# Patient Record
Sex: MALE | Race: WHITE | NOT HISPANIC OR LATINO | URBAN - METROPOLITAN AREA
[De-identification: names, ages, dates, MRNs, and addresses within clinical notes are randomized per-mention and may not be internally consistent; named-entity substitution may affect disease eponyms.]

---

## 2019-01-21 ENCOUNTER — OFFICE (OUTPATIENT)
Dept: URBAN - METROPOLITAN AREA CLINIC 19 | Facility: CLINIC | Age: 84
End: 2019-01-21
Payer: COMMERCIAL

## 2019-01-21 VITALS
WEIGHT: 233 LBS | HEART RATE: 65 BPM | HEIGHT: 70 IN | DIASTOLIC BLOOD PRESSURE: 64 MMHG | SYSTOLIC BLOOD PRESSURE: 150 MMHG

## 2019-01-21 DIAGNOSIS — K21.9 GASTRO-ESOPHAGEAL REFLUX DISEASE WITHOUT ESOPHAGITIS: ICD-10-CM

## 2019-01-21 PROCEDURE — 99204 OFFICE O/P NEW MOD 45 MIN: CPT | Performed by: INTERNAL MEDICINE

## 2019-02-21 ENCOUNTER — AMBULATORY SURGICAL CENTER (OUTPATIENT)
Dept: URBAN - METROPOLITAN AREA SURGERY 3 | Facility: SURGERY | Age: 84
End: 2019-02-21
Payer: COMMERCIAL

## 2019-02-21 VITALS
SYSTOLIC BLOOD PRESSURE: 179 MMHG | DIASTOLIC BLOOD PRESSURE: 92 MMHG | DIASTOLIC BLOOD PRESSURE: 92 MMHG | TEMPERATURE: 97.1 F | SYSTOLIC BLOOD PRESSURE: 152 MMHG | SYSTOLIC BLOOD PRESSURE: 163 MMHG | RESPIRATION RATE: 14 BRPM | SYSTOLIC BLOOD PRESSURE: 152 MMHG | OXYGEN SATURATION: 97 % | OXYGEN SATURATION: 96 % | RESPIRATION RATE: 20 BRPM | OXYGEN SATURATION: 95 % | OXYGEN SATURATION: 97 % | DIASTOLIC BLOOD PRESSURE: 80 MMHG | DIASTOLIC BLOOD PRESSURE: 78 MMHG | TEMPERATURE: 97.2 F | RESPIRATION RATE: 18 BRPM | RESPIRATION RATE: 24 BRPM | OXYGEN SATURATION: 94 % | OXYGEN SATURATION: 98 % | OXYGEN SATURATION: 98 % | SYSTOLIC BLOOD PRESSURE: 122 MMHG | WEIGHT: 230 LBS | RESPIRATION RATE: 20 BRPM | SYSTOLIC BLOOD PRESSURE: 165 MMHG | OXYGEN SATURATION: 96 % | OXYGEN SATURATION: 94 % | SYSTOLIC BLOOD PRESSURE: 163 MMHG | HEIGHT: 70 IN | OXYGEN SATURATION: 95 % | TEMPERATURE: 97.2 F | RESPIRATION RATE: 18 BRPM | HEART RATE: 65 BPM | WEIGHT: 230 LBS | SYSTOLIC BLOOD PRESSURE: 165 MMHG | SYSTOLIC BLOOD PRESSURE: 179 MMHG | RESPIRATION RATE: 24 BRPM | SYSTOLIC BLOOD PRESSURE: 122 MMHG | RESPIRATION RATE: 14 BRPM | DIASTOLIC BLOOD PRESSURE: 66 MMHG | DIASTOLIC BLOOD PRESSURE: 80 MMHG | TEMPERATURE: 97.1 F | DIASTOLIC BLOOD PRESSURE: 66 MMHG | DIASTOLIC BLOOD PRESSURE: 73 MMHG | HEIGHT: 70 IN | HEART RATE: 65 BPM | DIASTOLIC BLOOD PRESSURE: 73 MMHG | DIASTOLIC BLOOD PRESSURE: 78 MMHG

## 2019-02-21 DIAGNOSIS — K21.9 GASTRO-ESOPHAGEAL REFLUX DISEASE WITHOUT ESOPHAGITIS: ICD-10-CM

## 2019-02-21 DIAGNOSIS — K44.9 DIAPHRAGMATIC HERNIA WITHOUT OBSTRUCTION OR GANGRENE: ICD-10-CM

## 2019-02-21 PROBLEM — R11.10 REGURGITATION: Status: ACTIVE | Noted: 2019-02-21

## 2019-02-21 PROCEDURE — 43235 EGD DIAGNOSTIC BRUSH WASH: CPT | Performed by: INTERNAL MEDICINE

## 2019-02-21 PROCEDURE — G8918 PT W/O PREOP ORDER IV AB PRO: HCPCS | Performed by: INTERNAL MEDICINE

## 2019-02-21 PROCEDURE — G8907 PT DOC NO EVENTS ON DISCHARG: HCPCS | Performed by: INTERNAL MEDICINE

## 2021-03-19 ENCOUNTER — OFFICE (OUTPATIENT)
Dept: URBAN - METROPOLITAN AREA CLINIC 19 | Facility: CLINIC | Age: 86
End: 2021-03-19
Payer: COMMERCIAL

## 2021-03-19 VITALS
OXYGEN SATURATION: 99 % | SYSTOLIC BLOOD PRESSURE: 146 MMHG | DIASTOLIC BLOOD PRESSURE: 56 MMHG | WEIGHT: 243 LBS | HEART RATE: 73 BPM | HEIGHT: 70 IN

## 2021-03-19 DIAGNOSIS — K21.9 GASTRO-ESOPHAGEAL REFLUX DISEASE WITHOUT ESOPHAGITIS: ICD-10-CM

## 2021-03-19 DIAGNOSIS — K62.89 OTHER SPECIFIED DISEASES OF ANUS AND RECTUM: ICD-10-CM

## 2021-03-19 PROCEDURE — 99214 OFFICE O/P EST MOD 30 MIN: CPT | Performed by: INTERNAL MEDICINE

## 2021-03-19 RX ORDER — SODIUM PICOSULFATE, MAGNESIUM OXIDE, AND ANHYDROUS CITRIC ACID 10; 3.5; 12 MG/160ML; G/160ML; G/160ML
LIQUID ORAL
Qty: 1 | Refills: 0 | Status: COMPLETED
Start: 2021-03-19 | End: 2021-05-27

## 2021-05-27 ENCOUNTER — AMBULATORY SURGICAL CENTER (OUTPATIENT)
Dept: URBAN - METROPOLITAN AREA SURGERY 3 | Facility: SURGERY | Age: 86
End: 2021-05-27
Payer: COMMERCIAL

## 2021-05-27 ENCOUNTER — OFFICE (OUTPATIENT)
Dept: URBAN - METROPOLITAN AREA PATHOLOGY 22 | Facility: PATHOLOGY | Age: 86
End: 2021-05-27
Payer: COMMERCIAL

## 2021-05-27 VITALS
RESPIRATION RATE: 18 BRPM | TEMPERATURE: 97.4 F | RESPIRATION RATE: 18 BRPM | OXYGEN SATURATION: 94 % | OXYGEN SATURATION: 98 % | DIASTOLIC BLOOD PRESSURE: 63 MMHG | HEIGHT: 70 IN | HEART RATE: 65 BPM | DIASTOLIC BLOOD PRESSURE: 66 MMHG | RESPIRATION RATE: 17 BRPM | RESPIRATION RATE: 17 BRPM | HEART RATE: 65 BPM | RESPIRATION RATE: 14 BRPM | OXYGEN SATURATION: 95 % | OXYGEN SATURATION: 94 % | SYSTOLIC BLOOD PRESSURE: 90 MMHG | DIASTOLIC BLOOD PRESSURE: 77 MMHG | OXYGEN SATURATION: 98 % | RESPIRATION RATE: 18 BRPM | DIASTOLIC BLOOD PRESSURE: 66 MMHG | OXYGEN SATURATION: 95 % | SYSTOLIC BLOOD PRESSURE: 138 MMHG | DIASTOLIC BLOOD PRESSURE: 68 MMHG | DIASTOLIC BLOOD PRESSURE: 68 MMHG | RESPIRATION RATE: 14 BRPM | OXYGEN SATURATION: 95 % | SYSTOLIC BLOOD PRESSURE: 138 MMHG | SYSTOLIC BLOOD PRESSURE: 131 MMHG | HEART RATE: 66 BPM | DIASTOLIC BLOOD PRESSURE: 46 MMHG | DIASTOLIC BLOOD PRESSURE: 63 MMHG | SYSTOLIC BLOOD PRESSURE: 140 MMHG | OXYGEN SATURATION: 94 % | OXYGEN SATURATION: 98 % | SYSTOLIC BLOOD PRESSURE: 159 MMHG | RESPIRATION RATE: 14 BRPM | SYSTOLIC BLOOD PRESSURE: 133 MMHG | SYSTOLIC BLOOD PRESSURE: 140 MMHG | DIASTOLIC BLOOD PRESSURE: 63 MMHG | RESPIRATION RATE: 16 BRPM | DIASTOLIC BLOOD PRESSURE: 46 MMHG | DIASTOLIC BLOOD PRESSURE: 69 MMHG | SYSTOLIC BLOOD PRESSURE: 159 MMHG | RESPIRATION RATE: 16 BRPM | SYSTOLIC BLOOD PRESSURE: 131 MMHG | SYSTOLIC BLOOD PRESSURE: 131 MMHG | SYSTOLIC BLOOD PRESSURE: 140 MMHG | RESPIRATION RATE: 16 BRPM | TEMPERATURE: 97.4 F | HEART RATE: 65 BPM | SYSTOLIC BLOOD PRESSURE: 133 MMHG | WEIGHT: 230 LBS | DIASTOLIC BLOOD PRESSURE: 77 MMHG | DIASTOLIC BLOOD PRESSURE: 46 MMHG | SYSTOLIC BLOOD PRESSURE: 90 MMHG | SYSTOLIC BLOOD PRESSURE: 133 MMHG | WEIGHT: 230 LBS | SYSTOLIC BLOOD PRESSURE: 90 MMHG | WEIGHT: 230 LBS | DIASTOLIC BLOOD PRESSURE: 77 MMHG | DIASTOLIC BLOOD PRESSURE: 69 MMHG | DIASTOLIC BLOOD PRESSURE: 66 MMHG | TEMPERATURE: 97.4 F | HEART RATE: 66 BPM | SYSTOLIC BLOOD PRESSURE: 138 MMHG | HEIGHT: 70 IN | HEART RATE: 66 BPM | SYSTOLIC BLOOD PRESSURE: 159 MMHG | DIASTOLIC BLOOD PRESSURE: 68 MMHG | HEIGHT: 70 IN | RESPIRATION RATE: 17 BRPM | DIASTOLIC BLOOD PRESSURE: 69 MMHG

## 2021-05-27 DIAGNOSIS — D12.0 BENIGN NEOPLASM OF CECUM: ICD-10-CM

## 2021-05-27 DIAGNOSIS — Z86.010 PERSONAL HISTORY OF COLONIC POLYPS: ICD-10-CM

## 2021-05-27 DIAGNOSIS — K60.2 ANAL FISSURE, UNSPECIFIED: ICD-10-CM

## 2021-05-27 DIAGNOSIS — K57.30 DIVERTICULOSIS OF LARGE INTESTINE WITHOUT PERFORATION OR ABS: ICD-10-CM

## 2021-05-27 PROBLEM — K63.5 POLYP OF COLON: Status: ACTIVE | Noted: 2021-05-27

## 2021-05-27 PROCEDURE — 88305 TISSUE EXAM BY PATHOLOGIST: CPT | Performed by: INTERNAL MEDICINE

## 2021-05-27 PROCEDURE — 45385 COLONOSCOPY W/LESION REMOVAL: CPT | Mod: PT | Performed by: INTERNAL MEDICINE

## 2021-05-27 PROCEDURE — G8907 PT DOC NO EVENTS ON DISCHARG: HCPCS | Performed by: INTERNAL MEDICINE

## 2021-05-27 PROCEDURE — G8918 PT W/O PREOP ORDER IV AB PRO: HCPCS | Performed by: INTERNAL MEDICINE

## 2024-08-31 ENCOUNTER — APPOINTMENT (OUTPATIENT)
Dept: CT IMAGING | Facility: HOSPITAL | Age: 89
End: 2024-08-31
Payer: MEDICARE

## 2024-08-31 ENCOUNTER — HOSPITAL ENCOUNTER (INPATIENT)
Facility: HOSPITAL | Age: 89
LOS: 4 days | Discharge: HOME | End: 2024-09-04
Attending: EMERGENCY MEDICINE | Admitting: FAMILY MEDICINE
Payer: MEDICARE

## 2024-08-31 DIAGNOSIS — J18.9 PNEUMONIA OF BOTH LOWER LOBES DUE TO INFECTIOUS ORGANISM: ICD-10-CM

## 2024-08-31 DIAGNOSIS — R26.81 GAIT INSTABILITY: ICD-10-CM

## 2024-08-31 DIAGNOSIS — R55 SYNCOPE, UNSPECIFIED SYNCOPE TYPE: Primary | ICD-10-CM

## 2024-08-31 DIAGNOSIS — R13.13 PHARYNGEAL DYSPHAGIA: ICD-10-CM

## 2024-08-31 DIAGNOSIS — Z74.09 DECREASED FUNCTIONAL MOBILITY AND ENDURANCE: ICD-10-CM

## 2024-08-31 LAB
ALBUMIN SERPL-MCNC: 3.5 G/DL (ref 3.5–5.2)
ALBUMIN/GLOB SERPL: 1 G/DL
ALP SERPL-CCNC: 80 U/L (ref 39–117)
ALT SERPL W P-5'-P-CCNC: 23 U/L (ref 1–41)
ANION GAP SERPL CALCULATED.3IONS-SCNC: 9 MMOL/L (ref 5–15)
AST SERPL-CCNC: 41 U/L (ref 1–40)
B PARAPERT DNA SPEC QL NAA+PROBE: NOT DETECTED
B PERT DNA SPEC QL NAA+PROBE: NOT DETECTED
BASOPHILS # BLD AUTO: 0.02 10*3/MM3 (ref 0–0.2)
BASOPHILS NFR BLD AUTO: 0.3 % (ref 0–1.5)
BILIRUB SERPL-MCNC: 0.5 MG/DL (ref 0–1.2)
BILIRUB UR QL STRIP: NEGATIVE
BUN SERPL-MCNC: 30 MG/DL (ref 8–23)
BUN/CREAT SERPL: 17 (ref 7–25)
C PNEUM DNA NPH QL NAA+NON-PROBE: NOT DETECTED
CALCIUM SPEC-SCNC: 8.6 MG/DL (ref 8.6–10.5)
CHLORIDE SERPL-SCNC: 103 MMOL/L (ref 98–107)
CLARITY UR: CLEAR
CO2 SERPL-SCNC: 28 MMOL/L (ref 22–29)
COLOR UR: YELLOW
CREAT SERPL-MCNC: 1.76 MG/DL (ref 0.76–1.27)
D-LACTATE SERPL-SCNC: 1.3 MMOL/L (ref 0.5–2)
DEPRECATED RDW RBC AUTO: 54.3 FL (ref 37–54)
EGFRCR SERPLBLD CKD-EPI 2021: 36.7 ML/MIN/1.73
EOSINOPHIL # BLD AUTO: 0.02 10*3/MM3 (ref 0–0.4)
EOSINOPHIL NFR BLD AUTO: 0.3 % (ref 0.3–6.2)
ERYTHROCYTE [DISTWIDTH] IN BLOOD BY AUTOMATED COUNT: 15.9 % (ref 12.3–15.4)
FLUAV SUBTYP SPEC NAA+PROBE: NOT DETECTED
FLUBV RNA ISLT QL NAA+PROBE: NOT DETECTED
GEN 5 2HR TROPONIN T REFLEX: 36 NG/L
GLOBULIN UR ELPH-MCNC: 3.5 GM/DL
GLUCOSE SERPL-MCNC: 119 MG/DL (ref 65–99)
GLUCOSE UR STRIP-MCNC: NEGATIVE MG/DL
HADV DNA SPEC NAA+PROBE: NOT DETECTED
HCOV 229E RNA SPEC QL NAA+PROBE: NOT DETECTED
HCOV HKU1 RNA SPEC QL NAA+PROBE: NOT DETECTED
HCOV NL63 RNA SPEC QL NAA+PROBE: NOT DETECTED
HCOV OC43 RNA SPEC QL NAA+PROBE: NOT DETECTED
HCT VFR BLD AUTO: 32.1 % (ref 37.5–51)
HGB BLD-MCNC: 10.3 G/DL (ref 13–17.7)
HGB UR QL STRIP.AUTO: NEGATIVE
HMPV RNA NPH QL NAA+NON-PROBE: NOT DETECTED
HPIV1 RNA ISLT QL NAA+PROBE: NOT DETECTED
HPIV2 RNA SPEC QL NAA+PROBE: NOT DETECTED
HPIV3 RNA NPH QL NAA+PROBE: NOT DETECTED
HPIV4 P GENE NPH QL NAA+PROBE: NOT DETECTED
IMM GRANULOCYTES # BLD AUTO: 0.03 10*3/MM3 (ref 0–0.05)
IMM GRANULOCYTES NFR BLD AUTO: 0.4 % (ref 0–0.5)
KETONES UR QL STRIP: NEGATIVE
LEUKOCYTE ESTERASE UR QL STRIP.AUTO: NEGATIVE
LYMPHOCYTES # BLD AUTO: 0.43 10*3/MM3 (ref 0.7–3.1)
LYMPHOCYTES NFR BLD AUTO: 5.6 % (ref 19.6–45.3)
M PNEUMO IGG SER IA-ACNC: NOT DETECTED
MCH RBC QN AUTO: 29.7 PG (ref 26.6–33)
MCHC RBC AUTO-ENTMCNC: 32.1 G/DL (ref 31.5–35.7)
MCV RBC AUTO: 92.5 FL (ref 79–97)
MONOCYTES # BLD AUTO: 0.63 10*3/MM3 (ref 0.1–0.9)
MONOCYTES NFR BLD AUTO: 8.2 % (ref 5–12)
MRSA DNA SPEC QL NAA+PROBE: NORMAL
NEUTROPHILS NFR BLD AUTO: 6.54 10*3/MM3 (ref 1.7–7)
NEUTROPHILS NFR BLD AUTO: 85.2 % (ref 42.7–76)
NITRITE UR QL STRIP: NEGATIVE
NRBC BLD AUTO-RTO: 0 /100 WBC (ref 0–0.2)
PH UR STRIP.AUTO: 5.5 [PH] (ref 5–8)
PLATELET # BLD AUTO: 240 10*3/MM3 (ref 140–450)
PMV BLD AUTO: 11.2 FL (ref 6–12)
POTASSIUM SERPL-SCNC: 4.6 MMOL/L (ref 3.5–5.2)
PROT SERPL-MCNC: 7 G/DL (ref 6–8.5)
PROT UR QL STRIP: NEGATIVE
RBC # BLD AUTO: 3.47 10*6/MM3 (ref 4.14–5.8)
RHINOVIRUS RNA SPEC NAA+PROBE: NOT DETECTED
RSV RNA NPH QL NAA+NON-PROBE: NOT DETECTED
SARS-COV-2 RNA NPH QL NAA+NON-PROBE: NOT DETECTED
SODIUM SERPL-SCNC: 140 MMOL/L (ref 136–145)
SP GR UR STRIP: 1.01 (ref 1–1.03)
TROPONIN T DELTA: 1 NG/L
TROPONIN T SERPL HS-MCNC: 35 NG/L
UROBILINOGEN UR QL STRIP: NORMAL
WBC NRBC COR # BLD AUTO: 7.67 10*3/MM3 (ref 3.4–10.8)

## 2024-08-31 PROCEDURE — P9612 CATHETERIZE FOR URINE SPEC: HCPCS

## 2024-08-31 PROCEDURE — 93010 ELECTROCARDIOGRAM REPORT: CPT | Performed by: EMERGENCY MEDICINE

## 2024-08-31 PROCEDURE — 80053 COMPREHEN METABOLIC PANEL: CPT | Performed by: INTERNAL MEDICINE

## 2024-08-31 PROCEDURE — 0202U NFCT DS 22 TRGT SARS-COV-2: CPT | Performed by: FAMILY MEDICINE

## 2024-08-31 PROCEDURE — 94799 UNLISTED PULMONARY SVC/PX: CPT

## 2024-08-31 PROCEDURE — 93005 ELECTROCARDIOGRAM TRACING: CPT | Performed by: EMERGENCY MEDICINE

## 2024-08-31 PROCEDURE — 87040 BLOOD CULTURE FOR BACTERIA: CPT | Performed by: FAMILY MEDICINE

## 2024-08-31 PROCEDURE — 36415 COLL VENOUS BLD VENIPUNCTURE: CPT | Performed by: FAMILY MEDICINE

## 2024-08-31 PROCEDURE — 99285 EMERGENCY DEPT VISIT HI MDM: CPT

## 2024-08-31 PROCEDURE — 25010000002 PIPERACILLIN SOD-TAZOBACTAM PER 1 G: Performed by: INTERNAL MEDICINE

## 2024-08-31 PROCEDURE — 71250 CT THORAX DX C-: CPT

## 2024-08-31 PROCEDURE — 81003 URINALYSIS AUTO W/O SCOPE: CPT | Performed by: INTERNAL MEDICINE

## 2024-08-31 PROCEDURE — 87641 MR-STAPH DNA AMP PROBE: CPT | Performed by: FAMILY MEDICINE

## 2024-08-31 PROCEDURE — 36415 COLL VENOUS BLD VENIPUNCTURE: CPT

## 2024-08-31 PROCEDURE — 83605 ASSAY OF LACTIC ACID: CPT | Performed by: INTERNAL MEDICINE

## 2024-08-31 PROCEDURE — 70450 CT HEAD/BRAIN W/O DYE: CPT

## 2024-08-31 PROCEDURE — 74176 CT ABD & PELVIS W/O CONTRAST: CPT

## 2024-08-31 PROCEDURE — 85025 COMPLETE CBC W/AUTO DIFF WBC: CPT | Performed by: INTERNAL MEDICINE

## 2024-08-31 PROCEDURE — 94640 AIRWAY INHALATION TREATMENT: CPT

## 2024-08-31 PROCEDURE — 84484 ASSAY OF TROPONIN QUANT: CPT | Performed by: INTERNAL MEDICINE

## 2024-08-31 RX ORDER — MONTELUKAST SODIUM 10 MG/1
10 TABLET ORAL NIGHTLY
Status: DISCONTINUED | OUTPATIENT
Start: 2024-08-31 | End: 2024-09-03

## 2024-08-31 RX ORDER — RABEPRAZOLE SODIUM 20 MG/1
TABLET, DELAYED RELEASE ORAL
Status: ON HOLD | COMMUNITY
End: 2024-09-03

## 2024-08-31 RX ORDER — MEMANTINE HYDROCHLORIDE 5 MG/1
10 TABLET ORAL DAILY
Status: DISCONTINUED | OUTPATIENT
Start: 2024-08-31 | End: 2024-09-04 | Stop reason: HOSPADM

## 2024-08-31 RX ORDER — AMIODARONE HYDROCHLORIDE 200 MG/1
200 TABLET ORAL DAILY
COMMUNITY

## 2024-08-31 RX ORDER — DONEPEZIL HYDROCHLORIDE 5 MG/1
5 TABLET, FILM COATED ORAL NIGHTLY
COMMUNITY
Start: 2024-06-11

## 2024-08-31 RX ORDER — PREGABALIN 100 MG/1
100 CAPSULE ORAL TAKE AS DIRECTED
Status: ON HOLD | COMMUNITY
End: 2024-09-03

## 2024-08-31 RX ORDER — BISACODYL 5 MG/1
5 TABLET, DELAYED RELEASE ORAL DAILY PRN
Status: DISCONTINUED | OUTPATIENT
Start: 2024-08-31 | End: 2024-09-04 | Stop reason: HOSPADM

## 2024-08-31 RX ORDER — TAMSULOSIN HYDROCHLORIDE 0.4 MG/1
0.4 CAPSULE ORAL DAILY
Status: DISCONTINUED | OUTPATIENT
Start: 2024-08-31 | End: 2024-09-04 | Stop reason: HOSPADM

## 2024-08-31 RX ORDER — SODIUM CHLORIDE 0.9 % (FLUSH) 0.9 %
10 SYRINGE (ML) INJECTION AS NEEDED
Status: DISCONTINUED | OUTPATIENT
Start: 2024-08-31 | End: 2024-09-04 | Stop reason: HOSPADM

## 2024-08-31 RX ORDER — FUROSEMIDE 20 MG
20 TABLET ORAL 3 TIMES DAILY
COMMUNITY
Start: 2024-06-15

## 2024-08-31 RX ORDER — LEVOTHYROXINE SODIUM 125 UG/1
125 TABLET ORAL
COMMUNITY

## 2024-08-31 RX ORDER — ALLOPURINOL 100 MG/1
100 TABLET ORAL DAILY
Status: DISCONTINUED | OUTPATIENT
Start: 2024-08-31 | End: 2024-09-04 | Stop reason: HOSPADM

## 2024-08-31 RX ORDER — MONTELUKAST SODIUM 10 MG/1
TABLET ORAL
Status: ON HOLD | COMMUNITY
End: 2024-09-03

## 2024-08-31 RX ORDER — ALLOPURINOL 100 MG/1
100 TABLET ORAL 2 TIMES DAILY
COMMUNITY
Start: 2024-08-27

## 2024-08-31 RX ORDER — ATORVASTATIN CALCIUM 40 MG/1
40 TABLET, FILM COATED ORAL DAILY
COMMUNITY

## 2024-08-31 RX ORDER — ATORVASTATIN CALCIUM 10 MG/1
20 TABLET, FILM COATED ORAL DAILY
Status: DISCONTINUED | OUTPATIENT
Start: 2024-08-31 | End: 2024-09-04 | Stop reason: HOSPADM

## 2024-08-31 RX ORDER — TAMSULOSIN HYDROCHLORIDE 0.4 MG/1
2 CAPSULE ORAL NIGHTLY
COMMUNITY

## 2024-08-31 RX ORDER — ALBUTEROL SULFATE 5 MG/ML
1.25 SOLUTION RESPIRATORY (INHALATION)
Status: DISCONTINUED | OUTPATIENT
Start: 2024-08-31 | End: 2024-09-02

## 2024-08-31 RX ORDER — BUDESONIDE 0.5 MG/2ML
0.5 INHALANT ORAL
Status: DISCONTINUED | OUTPATIENT
Start: 2024-08-31 | End: 2024-09-04 | Stop reason: HOSPADM

## 2024-08-31 RX ORDER — AMOXICILLIN 250 MG
2 CAPSULE ORAL 2 TIMES DAILY
Status: DISCONTINUED | OUTPATIENT
Start: 2024-08-31 | End: 2024-09-04 | Stop reason: HOSPADM

## 2024-08-31 RX ORDER — GABAPENTIN 300 MG/1
CAPSULE ORAL
Status: ON HOLD | COMMUNITY
End: 2024-09-03

## 2024-08-31 RX ORDER — ONDANSETRON 2 MG/ML
4 INJECTION INTRAMUSCULAR; INTRAVENOUS EVERY 6 HOURS PRN
Status: DISCONTINUED | OUTPATIENT
Start: 2024-08-31 | End: 2024-09-04 | Stop reason: HOSPADM

## 2024-08-31 RX ORDER — AMIODARONE HYDROCHLORIDE 200 MG/1
200 TABLET ORAL
Status: DISCONTINUED | OUTPATIENT
Start: 2024-08-31 | End: 2024-09-04 | Stop reason: HOSPADM

## 2024-08-31 RX ORDER — MEMANTINE HYDROCHLORIDE 10 MG/1
10 TABLET ORAL 2 TIMES DAILY
COMMUNITY
Start: 2024-08-15

## 2024-08-31 RX ORDER — SODIUM CHLORIDE 0.9 % (FLUSH) 0.9 %
10 SYRINGE (ML) INJECTION EVERY 12 HOURS SCHEDULED
Status: DISCONTINUED | OUTPATIENT
Start: 2024-08-31 | End: 2024-09-04 | Stop reason: HOSPADM

## 2024-08-31 RX ORDER — SODIUM CHLORIDE 9 MG/ML
40 INJECTION, SOLUTION INTRAVENOUS AS NEEDED
Status: DISCONTINUED | OUTPATIENT
Start: 2024-08-31 | End: 2024-09-04 | Stop reason: HOSPADM

## 2024-08-31 RX ORDER — GABAPENTIN 300 MG/1
300 CAPSULE ORAL NIGHTLY
Status: DISCONTINUED | OUTPATIENT
Start: 2024-08-31 | End: 2024-09-03

## 2024-08-31 RX ORDER — FAMOTIDINE 20 MG/1
20 TABLET, FILM COATED ORAL
Status: DISCONTINUED | OUTPATIENT
Start: 2024-08-31 | End: 2024-09-02

## 2024-08-31 RX ORDER — LEVOTHYROXINE SODIUM 125 UG/1
125 TABLET ORAL DAILY
Status: DISCONTINUED | OUTPATIENT
Start: 2024-08-31 | End: 2024-09-04 | Stop reason: HOSPADM

## 2024-08-31 RX ORDER — BISACODYL 10 MG
10 SUPPOSITORY, RECTAL RECTAL DAILY PRN
Status: DISCONTINUED | OUTPATIENT
Start: 2024-08-31 | End: 2024-09-04 | Stop reason: HOSPADM

## 2024-08-31 RX ORDER — POLYETHYLENE GLYCOL 3350 17 G/17G
17 POWDER, FOR SOLUTION ORAL DAILY PRN
Status: DISCONTINUED | OUTPATIENT
Start: 2024-08-31 | End: 2024-09-04 | Stop reason: HOSPADM

## 2024-08-31 RX ADMIN — AMIODARONE HYDROCHLORIDE 200 MG: 200 TABLET ORAL at 19:01

## 2024-08-31 RX ADMIN — DOCUSATE SODIUM 50 MG AND SENNOSIDES 8.6 MG 2 TABLET: 8.6; 5 TABLET, FILM COATED ORAL at 21:07

## 2024-08-31 RX ADMIN — GABAPENTIN 300 MG: 300 CAPSULE ORAL at 21:07

## 2024-08-31 RX ADMIN — FAMOTIDINE 20 MG: 20 TABLET, FILM COATED ORAL at 19:03

## 2024-08-31 RX ADMIN — PIPERACILLIN SODIUM AND TAZOBACTAM SODIUM 3.38 G: 3; .375 INJECTION, POWDER, LYOPHILIZED, FOR SOLUTION INTRAVENOUS at 14:27

## 2024-08-31 RX ADMIN — MONTELUKAST SODIUM 10 MG: 10 TABLET, FILM COATED ORAL at 21:07

## 2024-08-31 RX ADMIN — Medication 10 ML: at 21:07

## 2024-08-31 RX ADMIN — MEMANTINE HYDROCHLORIDE 10 MG: 5 TABLET, FILM COATED ORAL at 19:01

## 2024-08-31 RX ADMIN — IPRATROPIUM BROMIDE 0.5 MG: 0.5 SOLUTION RESPIRATORY (INHALATION) at 18:29

## 2024-08-31 RX ADMIN — ALLOPURINOL 100 MG: 100 TABLET ORAL at 19:01

## 2024-08-31 RX ADMIN — ALBUTEROL SULFATE 1.25 MG: 2.5 SOLUTION RESPIRATORY (INHALATION) at 18:29

## 2024-08-31 RX ADMIN — ATORVASTATIN CALCIUM 20 MG: 10 TABLET, FILM COATED ORAL at 19:01

## 2024-08-31 RX ADMIN — TAMSULOSIN HYDROCHLORIDE 0.4 MG: 0.4 CAPSULE ORAL at 19:00

## 2024-08-31 RX ADMIN — BUDESONIDE 0.5 MG: 0.5 INHALANT RESPIRATORY (INHALATION) at 18:34

## 2024-08-31 RX ADMIN — APIXABAN 2.5 MG: 5 TABLET, FILM COATED ORAL at 21:07

## 2024-08-31 NOTE — PLAN OF CARE
Goal Outcome Evaluation:  Plan of Care Reviewed With: patient        Progress: no change  Outcome Evaluation: ADMITTED FROM ED. ALERT AND ORINETED. ROOM AIR. CONT. PULSE OX INTACT. SCD'S ON TELEMETRY ON. NO REQUEST FOR PRN PAIN MEDS. NO BM SINCE 08/28/2024. BOWEL SOUNDS NOTED. BED CHECK ON.

## 2024-08-31 NOTE — H&P
[facility]  HISTORY AND PHYSICAL    Date of Admission: 8/31/2024  Primary Care Physician: Toby Saavedra MD    Subjective   Primary Historian: Patient and family.    Chief Complaint: Vagovagal/constipation/pneumonia    History of Present Illness  Patient is 89-year-old male presented ER with episode of syncope while using the bathroom.  Initial blood pressure by EMS was 70 palpable.  CT scan shows possible pneumonia.  Patient received Zosyn in ER.  Discussed with ER believe this is more vagovagal causing syncope.  Patient was treated 3 weeks ago for pneumonia.  CT scans also shows constipation.    Patient has a past medical history of arthritis, atrial fibs, dementia, enlarged prostate, reflux, total knee replacement, colonoscopy, hard of hearing, hypertension, hyperlipidemia, hypothyroidism, neuropathy, pacemaker, renal disorder, sleep apnea.    Review of Systems   Constitutional:  Positive for activity change, appetite change and fatigue. Negative for chills and fever.   HENT:  Negative for hearing loss, nosebleeds, tinnitus and trouble swallowing.    Eyes:  Negative for visual disturbance.   Respiratory:  Negative for cough, chest tightness, shortness of breath and wheezing.    Cardiovascular:  Negative for chest pain, palpitations and leg swelling.   Gastrointestinal:  Negative for abdominal distention, abdominal pain, blood in stool, constipation, diarrhea, nausea and vomiting.   Endocrine: Negative for cold intolerance, heat intolerance, polydipsia, polyphagia and polyuria.   Genitourinary:  Negative for decreased urine volume, difficulty urinating, dysuria, flank pain, frequency and hematuria.   Musculoskeletal:  Positive for arthralgias, gait problem and myalgias. Negative for joint swelling.   Skin:  Negative for rash.   Allergic/Immunologic: Negative for immunocompromised state.   Neurological:  Positive for weakness. Negative for dizziness, syncope,  light-headedness and headaches.   Hematological:  Negative for adenopathy. Does not bruise/bleed easily.   Psychiatric/Behavioral:  Negative for confusion and sleep disturbance. The patient is not nervous/anxious.         Otherwise complete ROS reviewed and negative except as mentioned in the HPI.    Past Medical History:   Past Medical History:   Diagnosis Date    Arthritis     Atrial fib/flutter, transient     Dementia     Enlarged prostate     GERD (gastroesophageal reflux disease)     H/O total knee replacement     History of colonoscopy     [place] (hard of hearing)     Hypertension     Hypothyroidism     Neuropathy     Pacemaker     Renal disorder     Sleep apnea      Past Surgical History:History reviewed. No pertinent surgical history.  Social History:      Family History: family history includes Heart disease in his father and mother.       Allergies:  No Known Allergies    Medications:  Prior to Admission medications    Medication Sig Start Date End Date Taking? Authorizing Provider   allopurinol (ZYLOPRIM) 100 MG tablet  8/27/24  Yes Bandar Cates MD   donepezil (ARICEPT) 5 MG tablet  6/11/24  Yes ProviderBandar MD   furosemide (LASIX) 20 MG tablet  6/15/24  Yes ProviderBandar MD   memantine (NAMENDA) 10 MG tablet  8/15/24  Yes Provider, MD Bandar   amiodarone (PACERONE) 200 MG tablet     Provider, MD Bandar   apixaban (Eliquis) 2.5 MG tablet tablet     Provider, MD Bandar   atorvastatin (LIPITOR) 40 MG tablet     ProviderBandar MD   gabapentin (NEURONTIN) 300 MG capsule     Provider, MD Bandar   levothyroxine (Synthroid) 125 MCG tablet     Provider, MD Bandar   montelukast (SINGULAIR) 10 MG tablet     Provider, MD Bandar   pregabalin (LYRICA) 100 MG capsule Take 1 capsule by mouth 2 (Two) Times a Day.    Provider, MD Bandar   RABEprazole (ACIPHEX) 20 MG EC tablet     Provider, MD Bandar   tamsulosin (FLOMAX) 0.4 MG capsule 24 hr capsule      "Provider, Bandar, MD     I have utilized all available immediate resources to obtain, update, or review the patient's current medications (including all prescriptions, over-the-counter products, herbals, cannabis/cannabidiol products, and vitamin/mineral/dietary (nutritional) supplements).    Objective     Vital Signs: /62   Pulse 65   Temp 97.9 °F (36.6 °C) (Temporal)   Resp 20   Ht 175.3 cm (69\")   Wt 109 kg (240 lb)   SpO2 94%   BMI 35.44 kg/m²   Physical Exam  Vitals and nursing note reviewed.   Constitutional:       Comments: Advanced age.  Chronically ill.  Hard of hearing.   HENT:      Head: Normocephalic.   Eyes:      Conjunctiva/sclera: Conjunctivae normal.      Pupils: Pupils are equal, round, and reactive to light.   Cardiovascular:      Rate and Rhythm: Normal rate and regular rhythm.      Heart sounds: Normal heart sounds.   Pulmonary:      Effort: Pulmonary effort is normal. No respiratory distress.      Breath sounds: Normal breath sounds.      Comments: Patient is on room air.  Abdominal:      General: Bowel sounds are normal. There is no distension.      Palpations: Abdomen is soft.      Tenderness: There is no abdominal tenderness.      Comments: Obesity .   Musculoskeletal:         General: No swelling.      Cervical back: Neck supple.   Skin:     General: Skin is warm and dry.      Capillary Refill: Capillary refill takes 2 to 3 seconds.      Findings: No rash.   Neurological:      Mental Status: He is alert and oriented to person, place, and time.      Motor: Weakness present.      Coordination: Coordination abnormal.      Gait: Gait abnormal.   Psychiatric:         Mood and Affect: Mood normal.         Behavior: Behavior normal.              Results Reviewed:  Lab Results (last 24 hours)       Procedure Component Value Units Date/Time    High Sensitivity Troponin T 2Hr [[id number]]  (Abnormal) Collected: 08/31/24 1254    Specimen: Blood Updated: 08/31/24 1323     HS Troponin T " 36 ng/L      Troponin T Delta 1 ng/L     Narrative:      High Sensitive Troponin T Reference Range:  <14.0 ng/L- Negative Female for AMI  <22.0 ng/L- Negative Male for AMI  >=14 - Abnormal Female indicating possible myocardial injury.  >=22 - Abnormal Male indicating possible myocardial injury.   Clinicians would have to utilize clinical acumen, EKG, Troponin, and serial changes to determine if it is an Acute Myocardial Infarction or myocardial injury due to an underlying chronic condition.         Urinalysis With Culture If Indicated - Straight Cath [[id number]]  (Normal) Collected: 08/31/24 1141    Specimen: Urine from Straight Cath Updated: 08/31/24 1202     Color, UA Yellow     Appearance, UA Clear     pH, UA 5.5     Specific Gravity, UA 1.009     Glucose, UA Negative     Ketones, UA Negative     Bilirubin, UA Negative     Blood, UA Negative     Protein, UA Negative     Leuk Esterase, UA Negative     Nitrite, UA Negative     Urobilinogen, UA 0.2 E.U./dL    Narrative:      In absence of clinical symptoms, the presence of pyuria, bacteria, and/or nitrites on the urinalysis result does not correlate with infection.  Urine microscopic not indicated.    Comprehensive Metabolic Panel [[id number]]  (Abnormal) Collected: 08/31/24 1032    Specimen: Blood Updated: 08/31/24 1105     Glucose 119 mg/dL      BUN 30 mg/dL      Creatinine 1.76 mg/dL      Sodium 140 mmol/L      Potassium 4.6 mmol/L      Comment: Slight hemolysis detected by analyzer. Result may be falsely elevated.        Chloride 103 mmol/L      CO2 28.0 mmol/L      Calcium 8.6 mg/dL      Total Protein 7.0 g/dL      Albumin 3.5 g/dL      ALT (SGPT) 23 U/L      AST (SGOT) 41 U/L      Comment: Slight hemolysis detected by analyzer. Result may be falsely elevated.        Alkaline Phosphatase 80 U/L      Total Bilirubin 0.5 mg/dL      Globulin 3.5 gm/dL      A/G Ratio 1.0 g/dL      BUN/Creatinine Ratio 17.0     Anion Gap 9.0 mmol/L      eGFR 36.7 mL/min/1.73      Narrative:      GFR Normal >60  Chronic Kidney Disease <60  Kidney Failure <15    The GFR formula is only valid for adults with stable renal function between ages 18 and 70.    High Sensitivity Troponin T [[id number]]  (Abnormal) Collected: 08/31/24 1032    Specimen: Blood Updated: 08/31/24 1101     HS Troponin T 35 ng/L     Narrative:      High Sensitive Troponin T Reference Range:  <14.0 ng/L- Negative Female for AMI  <22.0 ng/L- Negative Male for AMI  >=14 - Abnormal Female indicating possible myocardial injury.  >=22 - Abnormal Male indicating possible myocardial injury.   Clinicians would have to utilize clinical acumen, EKG, Troponin, and serial changes to determine if it is an Acute Myocardial Infarction or myocardial injury due to an underlying chronic condition.         Lactic Acid, Plasma [[id number]]  (Normal) Collected: 08/31/24 1032    Specimen: Blood Updated: 08/31/24 1101     Lactate 1.3 mmol/L     CBC & Differential [[id number]]  (Abnormal) Collected: 08/31/24 1032    Specimen: Blood Updated: 08/31/24 1044    Narrative:      The following orders were created for panel order CBC & Differential.  Procedure                               Abnormality         Status                     ---------                               -----------         ------                     CBC Auto Differential[[id number]]        Abnormal            Final result                 Please view results for these tests on the individual orders.    CBC Auto Differential [[id number]]  (Abnormal) Collected: 08/31/24 1032    Specimen: Blood Updated: 08/31/24 1044     WBC 7.67 10*3/mm3      RBC 3.47 10*6/mm3      Hemoglobin 10.3 g/dL      Hematocrit 32.1 %      MCV 92.5 fL      MCH 29.7 pg      MCHC 32.1 g/dL      RDW 15.9 %      RDW-SD 54.3 fl      MPV 11.2 fL      Platelets 240 10*3/mm3      Neutrophil % 85.2 %      Lymphocyte % 5.6 %      Monocyte % 8.2 %      Eosinophil % 0.3 %      Basophil % 0.3 %      Immature Grans % 0.4 %       Neutrophils, Absolute 6.54 10*3/mm3      Lymphocytes, Absolute 0.43 10*3/mm3      Monocytes, Absolute 0.63 10*3/mm3      Eosinophils, Absolute 0.02 10*3/mm3      Basophils, Absolute 0.02 10*3/mm3      Immature Grans, Absolute 0.03 10*3/mm3      nRBC 0.0 /100 WBC           Imaging Results (Last 24 Hours)       Procedure Component Value Units Date/Time    CT Chest Without Contrast Diagnostic [[id number]] Collected: 08/31/24 1328     Updated: 08/31/24 1335    Narrative:      EXAM: CT CHEST WO CONTRAST DIAGNOSTIC-      DATE: 8/31/2024 11:45 AM     HISTORY: ? PNA seen on CTAP ? PNA       COMPARISON: CT abdomen and pelvis 8/31/2024.     DOSE LENGTH PRODUCT: 370.51 mGy.cm mGy cm. Automatic exposure control  was utilized to make radiation dose as low as reasonably achievable.     TECHNIQUE: Unenhanced CT images of the chest obtained with multiplanar  reformats.     FINDINGS:     MEDIASTINUM/EXTRATHORACIC: There is calcification of the thoracic aorta  which is torturous. There is coronary artery calcification and  calcification at the aortic valve. No pericardial or pleural effusion is  identified. Borderline mediastinal lymph nodes may be reactive. Lymph  node at the right hilum measures 1.2 cm in short axis.     LUNGS/AIRWAYS: There is scarring at the lung apices. There are patchy  bilateral areas of groundglass opacity right greater than left likely  related to pneumonitis. The airways are unremarkable. There is  atelectasis versus scarring at the lung bases.     INCLUDED UPPER ABDOMEN: There is cholelithiasis. Moderate stool is noted  in the colon.     SOFT TISSUES: There is mild gynecomastia.     BONES: No suspicious osseous lesion identified.       Impression:      1. Patchy bilateral areas of groundglass opacity right greater than left  likely infectious in etiology and borderline mediastinal and right hilar  lymph nodes are likely reactive.     This report was signed and finalized on 8/31/2024 1:32 PM by  Bro Schwartz.       CT Abdomen Pelvis Without Contrast [[id number]] Collected: 08/31/24 1107     Updated: 08/31/24 1114    Narrative:      EXAM: CT ABDOMEN PELVIS WO CONTRAST-      DATE: 8/31/2024 9:49 AM     HISTORY: Constipation       COMPARISON: None available.     DOSE LENGTH PRODUCT: 1557.56 mGy.cm Automatic exposure control was  utilized to make radiation dose as low as reasonably achievable.     TECHNIQUE: Noncontract axial images of the abdomen and pelvis obtained  with multiplanar reformats.     FINDINGS:  VISUALIZED CHEST: There is coronary artery calcification and  calcification at the aortic valve. No pericardial or pleural effusion is  identified. There is breathing motion artifact. There are patchy  groundglass opacities at both lung bases may represent pneumonia.     LIVER/BILE DUCTS: Unenhanced liver is unremarkable. There is  cholelithiasis without inflammatory change. Bile ducts are unremarkable.     KIDNEYS/URETERS: There is renal atrophy. No renal or ureteral calculi or  hydronephrosis is seen.     ADRENAL: Unremarkable.        SPLEEN: Unremarkable.     PANCREAS: Fatty infiltrated but otherwise unremarkable.     MESENTERY: No mesenteric or retroperitoneal lymphadenopathy or free  fluid is seen.     VASCULATURE: There is calcified atherosclerosis of the abdominal aorta  without aneurysm.     GI TRACT: There is moderate stool in the colon. No bowel obstruction or  inflammatory changes are seen.     PELVIS: There are small inguinal hernias containing fat. Urinary bladder  is unremarkable. No pelvic lymphadenopathy or free fluid is seen.     SOFT TISSUES: Small umbilical hernia contains fat.     BONES: No acute or aggressive bony lesion.           Impression:      1. Moderate constipation.  2. Cholelithiasis without definite inflammatory change.  3. Small inguinal hernias containing fat.  4. Patchy nodular groundglass opacities at the lung bases may represent  pneumonia.        This report was  signed and finalized on 8/31/2024 11:11 AM by Bro Schwartz.       CT Head Without Contrast [[id number]] Collected: 08/31/24 1104     Updated: 08/31/24 1108    Narrative:      EXAM: CT HEAD WO CONTRAST-      DATE: 8/31/2024 9:49 AM     HISTORY: Syncope and collapse       COMPARISON: None available.     DOSE LENGTH PRODUCT: 1557.56 mGy.cm  Automated exposure control was also  utilized to decrease patient radiation dose.     TECHNIQUE: Unenhanced CT images obtained from vertex to skull base with  multiplanar reformats.     FINDINGS:  There is no acute intracranial hemorrhage, midline shift, mass effect,  or hydrocephalus. There is no CT evidence for acute infarct.     There are chronic changes with volume loss and chronic small vessel  ischemic change of the periventricular white matter.      SOFT TISSUES: The scalp soft tissues are unremarkable.        SINUS: The visualized paranasal sinuses are clear. There is  opacification of the right mastoid air cells.     ORBITS: The visualized orbits and globes are unremarkable. There is  bilateral lens extraction.          Impression:      1. Chronic changes and no acute intracranial findings.   2. Opacification of the right mastoid air cells.     This report was signed and finalized on 8/31/2024 11:05 AM by Bro Schwartz.             I have personally reviewed and interpreted the radiology studies and ECG obtained at time of admission.     Assessment / Plan   Assessment:   Active Hospital Problems    Diagnosis     **Pneumonia     HCAP (healthcare-associated pneumonia)     Atrial fib/flutter, transient     Dementia     Arthritis     GERD (gastroesophageal reflux disease)     Enlarged prostate     H/O total knee replacement     [place] (hard of hearing)     Hypertension     Hypothyroidism     Neuropathy     Pacemaker     Renal disorder     Sleep apnea        Treatment Plan  The patient will be admitted to my service here at [facility].     Pneumonia/COPD.  Zosyn  was started in ER.  Singulair.  Continue Zosyn.  Half-strength DuoNebs.  Pulmicort.  CT scan of chest- Patchy bilateral areas of groundglass opacity right greater than left  likely infectious in etiology and borderline mediastinal and right hilar  lymph nodes are likely reactive.  Patient is on room air.    Anemia.  No sign of acute bleed.    History of atrial fibs/Pacemaker/syncope/CAD/hypertension/hyperlipidemia/pacemaker.  Episode of syncope during bowel movement episode, vasovagal.  Amiodarone . Eliquis.  Lipitor.  Hold Lasix.  Echocardiogram .    Dementia.  Hold Aricept due to GI side effects.  Continue Namenda.  CT scan of the head-Chronic changes and no acute intracranial findings, Opacification of the right mastoid air cells.     Gallbladder stone.  Denies any abdomen pain.  CT scan abdomen pelvic- Moderate constipation, Cholelithiasis without definite inflammatory change, Small inguinal hernias containing fat, Patchy nodular groundglass opacities at the lung bases may represent pneumonia.    History of complete occlusion of left carotid artery.  Plan request Doppler ultrasound of the neck.  Carotid duplex.     Constipation.  Constipation protocol.    Reflux/small inguinal hernia.  Pepcid.  Zofran as needed.    Chronic kidney disease stage III.  Will follow.  Creatinine is 1.7.    Neuropathy.  Neurontin nightly.    Hypothyroidism.  Synthroid.  TSH.    Prostate hypertrophy.  Flomax.    History of left knee replacement.  History of four back surgery.    Gout.  Allopurinol.    SCD.    Advanced age. 89 years old .    Nutrition.  Cardiac diet/renal diet.    Deconditioning.  PT and OT consult.    Family wants patient to [facility] to [facility] in [facility] for rehab.  Consult .    DNR.  DNI.    Blood cultures pending.    Medical Decision Making  Number and Complexity of problems: Pneumonia/dementia/failure to thrive/CAD/atrial fibrillation  Differential Diagnosis: None    Conditions and Status         Condition is unchanged.     [facility] Data  External documents reviewed: None.  Cardiac tracing (EKG, telemetry) interpretation: Pacing .  Radiology interpretation: CT scan .  Labs reviewed: Laboratory .  Any tests that were considered but not ordered: Lab in a.m.     Decision rules/scores evaluated (example [id number], [place], etc): None     Discussed with: Patient and family     Care Planning  Shared decision making: Patient and family  Code status and discussions: DNR . DNI    Disposition  Social Determinants of Health that impact treatment or disposition: From home  Estimated length of stay is 2 to 4 days.     I confirmed that the patient's advanced care plan is present, code status is documented, and a surrogate decision maker is listed in the patient's medical record.     The patient's surrogate decision maker is wife.     The patient was seen and examined by me on 8/31/2024 at 1430.    Electronically signed by Rashid Millard MD, 08/31/24, 15:26 CDT.

## 2024-08-31 NOTE — ED PROVIDER NOTES
Subjective   History of Present Illness  89-year-old male who presents emergency department after an episode of syncope while on the toilet.  The patient states he was sitting on the commode trying to have a bowel movement.  He states he woke up on the toilet.  He does not realize how long he was out for.  The patient was in route via helicopter to his usual hospital in [place], whether approached and the helicopter could not continue to fly forward.  It was noted that the patient's blood pressure systolic on scene was 70.  He is currently controlled.  He has a pacemaker.  He tells me over the last couple days he has declined with generalized weakness.  He states he could not hardly walk this morning when he got up.  He has chronic balance problems and neuropathy in his feet.  He has recently followed with home health and he is getting physical therapy.  He notes constipation for the last 3 days.  He states 3 days ago was his last good bowel movement.  He notes no blood in his stool.  He does have a history of trouble getting his urine out.  He has no chest pain or shortness of air on my exam    Review of Systems   Neurological:  Positive for syncope and weakness.       Past Medical History:   Diagnosis Date    Arthritis     Atrial fib/flutter, transient     Dementia     Enlarged prostate     GERD (gastroesophageal reflux disease)     H/O total knee replacement     History of colonoscopy     [place] (hard of hearing)     Hypertension     Hypothyroidism     Neuropathy     Pacemaker     Renal disorder     Sleep apnea      Health Status  Allergies:  Allergic Reactions (All)  NKA.  Medications: Per nurse's notes.  Immunizations: Per nurse's notes.    Past Medical/ Family/ Social History  Medical history:  Active  Afib ([id number])  Comments:  06/19/2013 CDT 11:18 CDT - TAMMY WRAY RN  patient has pacemaker  HTN (hypertension) ([id number])  Hyperlipidemia (75533795)  Enlarged prostate (993272371)  Hypothyroidism  (60233858)  [place] (hard of hearing) (41258332)  Acid reflux (192466844)  Arthritis (3839223)  Sleep apnea (119323702)  Comments:  06/19/2013 CDT 11:19 TAMMY MUSE RN  Patient is prescribed a CPAP at  but does not wear it, Reviewed as documented in chart.    Surgical history:  Colonoscopy Control Bleeding on 06/04/2021 at 86 Years.  Comments:  06/04/2021 10:13 KATHERIN GARCIA RN  auto-populated from documented surgical case  Colonoscopy with Submucosal Injection on 06/04/2021 at 86 Years.  Comments:  06/04/2021 10:13 KATHERIN GARCIA RN  auto-populated from documented surgical case  Colonoscopy GI Lab on 06/03/2021 at 86 Years.  Comments:  06/03/2021 16:00 CDT TAY LANCASTER RN  auto-populated from documented surgical case  Lumbar myelogram ([id number]) on 06/11/2020 at 85 Years.  Replacement of cardiac pacemaker battery ([id number]) on 08/24/2018 at 83 Years.  Total knee replacement ([id number]) in 2013 at 78 Years.  Procedure ([id number]) in 2013 at 78 Years.  Comments:  06/11/2020 09:34 CDLAURA - ABAD SULLIVAN RN  Multiple injections of steroids and anelgesia into back  lumbar back surgery x 4 in 1990 at 55 Years.  Permanent Pacemaker Placement., Reviewed as documented in chart.    Family history:  Heart attack  Mother  Breast cancer  Sister  Cancer of prostate  Brother  Stroke  Sister  , Reviewed as documented in chart.    Social history: Include Social History  Alcohol  Risk Assessment: Denies Alcohol Use  Substance Abuse  Risk Assessment: Denies Substance Abuse  Tobacco  Risk Assessment: Denies Tobacco Use  , Alcohol use: Denies, Tobacco use: Denies, Drug use: Denies,     Occupation: Retired, Family/social situation: .  Problem list: Per nurse's notes.    DC medications from 7/16/24:  Unchanged allopurinol (allopurinol 100 mg oral tablet) 2 tab(s) Oral Once daily  Unchanged amiodarone (amiodarone 200 mg oral tablet) 1 tab(s) Oral Once daily  Unchanged apixaban (Eliquis 2.5 mg  oral tablet) 1 tab(s) Oral 2 times a day  Unchanged atorvastatin (Lipitor 40 mg oral tablet) 1 tab(s) Oral Bedtime  Unchanged donepezil (donepezil 5 mg oral tablet) 1 tab(s) Oral Once daily  Unchanged furosemide (furosemide 20 mg oral tablet) 1 tab(s) Oral 3 times a day  Unchanged gabapentin (gabapentin 300 mg oral capsule) 1 cap Oral 3 times a day Leg pain, bilateral MUST SEE NP FOR ADDITIONAL REFILLS  Unchanged levothyroxine (levothyroxine 125 mcg (0.125 mg) oral tablet) 1 tab(s) Oral Once daily  Unchanged memantine (memantine 10 mg oral tablet) 1 tab(s) Oral 2 times a day  Unchanged montelukast (Singulair 10 mg oral tablet) 1 tab(s) Oral Every evening  Unchanged pregabalin (pregabalin 100 mg oral capsule) ,cap(s) Oral  Unchanged RABEprazole (RABEprazole 20 mg oral delayed release tablet) 1 tab(s) Once daily  Unchanged tamsulosin (tamsulosin 0.4 mg oral capsule)     No Known Allergies      Social History     Socioeconomic History    Marital status:      Labs reviewed from prior facility:  CBC   [facility]08/14/2024  Component 08/14/2024 08/13/2024 07/18/2024 07/17/2024 07/16/2024           White Blood Cell Count 7.4 9.6 4.7 Low     4.1 Low     7.0   HEMOGLOBIN 9.5 Low     11.0 Low     9.2 Low     9.6 Low     11.1 Low       HEMATOCRIT 29.3 Low     34.8 Low     29.4 Low     31.0 Low     36.1 Low       Platelet Count Plasma 174 201 134 Low     137 Low     149   MPV 11.6 12.0 High     12.3 High     12.3 High     12.0 High       MEAN CELL VOLUME 94 95 99 99 98   RDW 54 High     54 High     60 High     60 High     58 High       MEAN CELL HEMOGLOBIN 30.4 29.9 31.1 High     30.8 30.2   MEAN CELL HEMOGLOBIN CONCENTRATION 32.4 Low     31.6 Low     31.3 Low     31.0 Low             CHEM PROFILE   [facility]08/14/2024  Component 08/14/2024 08/13/2024 07/18/2024 07/17/2024 07/16/2024 07/16/2024 07/16/2024 07/16/2024              Sodium 141 139 141 139 143 -- -- --   Potassium 4.4 4.7 3.4  Low     3.6 4.1 -- -- --   Chloride 102 102 108 High     108 High     109 High     -- -- --   CO2 28 28 25 24 24 -- -- --   Anion Gap 11.0 9.0 8.0 7.0 10.0 -- -- --   Glucose 93 126 High     102 110 122 High     -- -- --   BUN 29 High     31 High     29 High     29 High     31 High     -- -- --   Creatinine 1.47 High     2.00 High     1.61 High     1.55 High     1.74 High     -- -- --   BUN/CREATININE RATIO 19.7 15.5 18.0 18.7 17.8 -- -- --   Calcium 8.2 Low     8.5 8.3 Low     7.7 Low     8.6          Objective   Physical Exam  Vitals reviewed.   Constitutional:       Appearance: Normal appearance. He is not ill-appearing.   HENT:      Head: Normocephalic and atraumatic.      Right Ear: External ear normal.      Left Ear: External ear normal.      Nose: Nose normal.   Eyes:      General: No scleral icterus.     Conjunctiva/sclera: Conjunctivae normal.   Cardiovascular:      Rate and Rhythm: Normal rate and regular rhythm.      Heart sounds: Normal heart sounds.   Pulmonary:      Effort: Pulmonary effort is normal.      Breath sounds: Normal breath sounds.   Abdominal:      General: There is no distension.      Palpations: Abdomen is soft.   Musculoskeletal:         General: No tenderness.      Cervical back: Normal range of motion and neck supple.      Comments: Mild LE edema bilaterally   Skin:     General: Skin is warm and dry.   Neurological:      General: No focal deficit present.      Mental Status: He is alert.      Cranial Nerves: No cranial nerve deficit.   Psychiatric:         Mood and Affect: Mood normal.         Behavior: Behavior normal.         Procedures       Lab Results (last 24 hours)       Procedure Component Value Units Date/Time    CBC & Differential [[id number]]  (Abnormal) Collected: 08/31/24 1032    Specimen: Blood Updated: 08/31/24 1044    Narrative:      The following orders were created for panel order CBC & Differential.  Procedure                               Abnormality         Status                      ---------                               -----------         ------                     CBC Auto Differential[[id number]]        Abnormal            Final result                 Please view results for these tests on the individual orders.    Comprehensive Metabolic Panel [[id number]]  (Abnormal) Collected: 08/31/24 1032    Specimen: Blood Updated: 08/31/24 1105     Glucose 119 mg/dL      BUN 30 mg/dL      Creatinine 1.76 mg/dL      Sodium 140 mmol/L      Potassium 4.6 mmol/L      Comment: Slight hemolysis detected by analyzer. Result may be falsely elevated.        Chloride 103 mmol/L      CO2 28.0 mmol/L      Calcium 8.6 mg/dL      Total Protein 7.0 g/dL      Albumin 3.5 g/dL      ALT (SGPT) 23 U/L      AST (SGOT) 41 U/L      Comment: Slight hemolysis detected by analyzer. Result may be falsely elevated.        Alkaline Phosphatase 80 U/L      Total Bilirubin 0.5 mg/dL      Globulin 3.5 gm/dL      A/G Ratio 1.0 g/dL      BUN/Creatinine Ratio 17.0     Anion Gap 9.0 mmol/L      eGFR 36.7 mL/min/1.73     Narrative:      GFR Normal >60  Chronic Kidney Disease <60  Kidney Failure <15    The GFR formula is only valid for adults with stable renal function between ages 18 and 70.    High Sensitivity Troponin T [[id number]]  (Abnormal) Collected: 08/31/24 1032    Specimen: Blood Updated: 08/31/24 1101     HS Troponin T 35 ng/L     Narrative:      High Sensitive Troponin T Reference Range:  <14.0 ng/L- Negative Female for AMI  <22.0 ng/L- Negative Male for AMI  >=14 - Abnormal Female indicating possible myocardial injury.  >=22 - Abnormal Male indicating possible myocardial injury.   Clinicians would have to utilize clinical acumen, EKG, Troponin, and serial changes to determine if it is an Acute Myocardial Infarction or myocardial injury due to an underlying chronic condition.         Lactic Acid, Plasma [[id number]]  (Normal) Collected: 08/31/24 1032    Specimen: Blood Updated: 08/31/24 1101     Lactate 1.3  mmol/L     CBC Auto Differential [[id number]]  (Abnormal) Collected: 08/31/24 1032    Specimen: Blood Updated: 08/31/24 1044     WBC 7.67 10*3/mm3      RBC 3.47 10*6/mm3      Hemoglobin 10.3 g/dL      Hematocrit 32.1 %      MCV 92.5 fL      MCH 29.7 pg      MCHC 32.1 g/dL      RDW 15.9 %      RDW-SD 54.3 fl      MPV 11.2 fL      Platelets 240 10*3/mm3      Neutrophil % 85.2 %      Lymphocyte % 5.6 %      Monocyte % 8.2 %      Eosinophil % 0.3 %      Basophil % 0.3 %      Immature Grans % 0.4 %      Neutrophils, Absolute 6.54 10*3/mm3      Lymphocytes, Absolute 0.43 10*3/mm3      Monocytes, Absolute 0.63 10*3/mm3      Eosinophils, Absolute 0.02 10*3/mm3      Basophils, Absolute 0.02 10*3/mm3      Immature Grans, Absolute 0.03 10*3/mm3      nRBC 0.0 /100 WBC     Urinalysis With Culture If Indicated - Straight Cath [[id number]]  (Normal) Collected: 08/31/24 1141    Specimen: Urine from Straight Cath Updated: 08/31/24 1202     Color, UA Yellow     Appearance, UA Clear     pH, UA 5.5     Specific Gravity, UA 1.009     Glucose, UA Negative     Ketones, UA Negative     Bilirubin, UA Negative     Blood, UA Negative     Protein, UA Negative     Leuk Esterase, UA Negative     Nitrite, UA Negative     Urobilinogen, UA 0.2 E.U./dL    Narrative:      In absence of clinical symptoms, the presence of pyuria, bacteria, and/or nitrites on the urinalysis result does not correlate with infection.  Urine microscopic not indicated.    High Sensitivity Troponin T 2Hr [[id number]]  (Abnormal) Collected: 08/31/24 1254    Specimen: Blood Updated: 08/31/24 1323     HS Troponin T 36 ng/L      Troponin T Delta 1 ng/L     Narrative:      High Sensitive Troponin T Reference Range:  <14.0 ng/L- Negative Female for AMI  <22.0 ng/L- Negative Male for AMI  >=14 - Abnormal Female indicating possible myocardial injury.  >=22 - Abnormal Male indicating possible myocardial injury.   Clinicians would have to utilize clinical acumen, EKG, Troponin, and  serial changes to determine if it is an Acute Myocardial Infarction or myocardial injury due to an underlying chronic condition.               CT Chest Without Contrast Diagnostic   Final Result   1. Patchy bilateral areas of groundglass opacity right greater than left   likely infectious in etiology and borderline mediastinal and right hilar   lymph nodes are likely reactive.       This report was signed and finalized on 8/31/2024 1:32 PM by Bro Schwartz.          CT Head Without Contrast   Final Result   1. Chronic changes and no acute intracranial findings.    2. Opacification of the right mastoid air cells.       This report was signed and finalized on 8/31/2024 11:05 AM by Bro Schwartz.          CT Abdomen Pelvis Without Contrast   Final Result   1. Moderate constipation.   2. Cholelithiasis without definite inflammatory change.   3. Small inguinal hernias containing fat.   4. Patchy nodular groundglass opacities at the lung bases may represent   pneumonia.           This report was signed and finalized on 8/31/2024 11:11 AM by Bro Schwartz.            Lesli ordered for HCAP    ED Course  ED Course as of 08/31/24 1352   Sat Aug 31, 2024   1206 Wife now at bedside. She stats that the patient was admitted to the hospital recently for PNA. She states that he did recently stop ABX for this. She is uncertain of the ABX that he was taking. Patient notes intermittent coughing that has worsened.  [AJ]   1207 CT scan of the chest ordered to evaluate.  [AJ]   1207 Labs from prior reviewed and appears that CR and HBG appear chronic.  [AJ]   1336 CURB 65 +2 points moderate risk [AJ]   1346 Spoke with family. Discussed possible admission. They are OK if he is admitted to our facility. Will contact hospitalist.  [AJ]   1350 Hospitalist agreeable to admission. Dr. Millard [AJ]      ED Course User Index  [AJ] Apple Berry, DO                                             Medical Decision Making  DDX:  Vasovagal syncope, UTI, or light abnormality    Problems Addressed:  Pneumonia of both lower lobes due to infectious organism: complicated acute illness or injury  Syncope, unspecified syncope type: complicated acute illness or injury    Amount and/or Complexity of Data Reviewed  Labs: ordered.     Details: CBC CMP troponin UA  Radiology: ordered.     Details: CT head and abdomen  ECG/medicine tests: ordered.    Risk  Decision regarding hospitalization.        Final diagnoses:   Syncope, unspecified syncope type   Pneumonia of both lower lobes due to infectious organism       ED Disposition  ED Disposition       ED Disposition   Decision to Admit    Condition   --    Comment   Level of Care: Telemetry [5]   Diagnosis: Pneumonia [616724]   Admitting Physician: LINDSEY LAWSON [[id number]]                 No follow-up provider specified.       Medication List      No changes were made to your prescriptions during this visit.            Apple Berry, DO  08/31/24 1102       Apple Berry, DO  08/31/24 1130       Apple Berry, DO  08/31/24 1141       Apple Berry, DO  08/31/24 1150       Apple Berry, DO  08/31/24 1353

## 2024-08-31 NOTE — ED NOTES
Nursing report ED to floor  Rubin Martell  89 y.o.  male    HPI:   Chief Complaint   Patient presents with    Syncope       Admitting doctor:   Rashid Millard MD    Consulting provider(s):  Consults       No orders found from 8/2/2024 to 9/1/2024.             Admitting diagnosis:   The primary encounter diagnosis was Syncope, unspecified syncope type. A diagnosis of Pneumonia of both lower lobes due to infectious organism was also pertinent to this visit.    Code status:   Current Code Status       Date Active Code Status Order ID Comments User Context       Not on file            Allergies:   Patient has no known allergies.    Intake and Output    Intake/Output Summary (Last 24 hours) at 8/31/2024 1548  Last data filed at 8/31/2024 1506  Gross per 24 hour   Intake 100 ml   Output 1400 ml   Net -1300 ml       Weight:       08/31/24  1001   Weight: 109 kg (240 lb)       Most recent vitals:   Vitals:    08/31/24 1446 08/31/24 1501 08/31/24 1516 08/31/24 1531   BP: 138/59 130/61 139/64 130/62   BP Location:       Patient Position:       Pulse: 65 65 65 65   Resp:       Temp:       TempSrc:       SpO2: 95% 95% 95% 92%   Weight:       Height:         Oxygen Therapy: .    Active LDAs/IV Access:   Lines, Drains & Airways       Active LDAs       Name Placement date Placement time Site Days    Peripheral IV 08/31/24 0935 Posterior;Right Hand 08/31/24  0935  Hand  less than 1    Peripheral IV 08/31/24 1036 Anterior;Left Forearm 08/31/24  1036  Forearm  less than 1                    Labs (abnormal labs have a star):   Labs Reviewed   COMPREHENSIVE METABOLIC PANEL - Abnormal; Notable for the following components:       Result Value    Glucose 119 (*)     BUN 30 (*)     Creatinine 1.76 (*)     AST (SGOT) 41 (*)     eGFR 36.7 (*)     All other components within normal limits    Narrative:     GFR Normal >60  Chronic Kidney Disease <60  Kidney Failure <15    The GFR formula is only valid for adults with stable renal function between  ages 18 and 70.   TROPONIN - Abnormal; Notable for the following components:    HS Troponin T 35 (*)     All other components within normal limits    Narrative:     High Sensitive Troponin T Reference Range:  <14.0 ng/L- Negative Female for AMI  <22.0 ng/L- Negative Male for AMI  >=14 - Abnormal Female indicating possible myocardial injury.  >=22 - Abnormal Male indicating possible myocardial injury.   Clinicians would have to utilize clinical acumen, EKG, Troponin, and serial changes to determine if it is an Acute Myocardial Infarction or myocardial injury due to an underlying chronic condition.        CBC WITH AUTO DIFFERENTIAL - Abnormal; Notable for the following components:    RBC 3.47 (*)     Hemoglobin 10.3 (*)     Hematocrit 32.1 (*)     RDW 15.9 (*)     RDW-SD 54.3 (*)     Neutrophil % 85.2 (*)     Lymphocyte % 5.6 (*)     Lymphocytes, Absolute 0.43 (*)     All other components within normal limits   HIGH SENSITIVITIY TROPONIN T 2HR - Abnormal; Notable for the following components:    HS Troponin T 36 (*)     All other components within normal limits    Narrative:     High Sensitive Troponin T Reference Range:  <14.0 ng/L- Negative Female for AMI  <22.0 ng/L- Negative Male for AMI  >=14 - Abnormal Female indicating possible myocardial injury.  >=22 - Abnormal Male indicating possible myocardial injury.   Clinicians would have to utilize clinical acumen, EKG, Troponin, and serial changes to determine if it is an Acute Myocardial Infarction or myocardial injury due to an underlying chronic condition.        LACTIC ACID, PLASMA - Normal   URINALYSIS W/ CULTURE IF INDICATED - Normal    Narrative:     In absence of clinical symptoms, the presence of pyuria, bacteria, and/or nitrites on the urinalysis result does not correlate with infection.  Urine microscopic not indicated.   BLOOD CULTURE WITH DESIRE   BLOOD CULTURE WITH DESIRE   CBC AND DIFFERENTIAL    Narrative:     The following orders were created for panel  order CBC & Differential.  Procedure                               Abnormality         Status                     ---------                               -----------         ------                     CBC Auto Differential[[id number]]        Abnormal            Final result                 Please view results for these tests on the individual orders.       Meds given in ED:   Medications   piperacillin-tazobactam (ZOSYN) 3.375 g IVPB in 100 mL NS MBP (CD) (0 g Intravenous Stopped 8/31/24 1506)     No current facility-administered medications for this encounter.       NIH Stroke Scale:       Isolation/Infection(s):  No active isolations   No active infections     COVID Testing  Collected .  Resulted .    Nursing report ED to floor:  Mental status: .  Ambulatory status: .  Precautions: .    ED nurse phone extentsion- ..

## 2024-08-31 NOTE — Clinical Note
Level of Care: Telemetry [5]   Diagnosis: Pneumonia [[id number]]   Admitting Physician: LINDSEY LAWSON [[id number]]

## 2024-09-01 ENCOUNTER — APPOINTMENT (OUTPATIENT)
Dept: CARDIOLOGY | Facility: HOSPITAL | Age: 89
End: 2024-09-01
Payer: MEDICARE

## 2024-09-01 ENCOUNTER — APPOINTMENT (OUTPATIENT)
Dept: ULTRASOUND IMAGING | Facility: HOSPITAL | Age: 89
End: 2024-09-01
Payer: MEDICARE

## 2024-09-01 PROBLEM — R55 VASOVAGAL SYNCOPE: Status: ACTIVE | Noted: 2024-09-01

## 2024-09-01 LAB
ALBUMIN SERPL-MCNC: 3.1 G/DL (ref 3.5–5.2)
ALBUMIN/GLOB SERPL: 1 G/DL
ALP SERPL-CCNC: 74 U/L (ref 39–117)
ALT SERPL W P-5'-P-CCNC: 20 U/L (ref 1–41)
ANION GAP SERPL CALCULATED.3IONS-SCNC: 7 MMOL/L (ref 5–15)
AST SERPL-CCNC: 31 U/L (ref 1–40)
BASOPHILS # BLD AUTO: 0.03 10*3/MM3 (ref 0–0.2)
BASOPHILS NFR BLD AUTO: 0.7 % (ref 0–1.5)
BH CV ECHO MEAS - AO MAX PG: 32 MMHG
BH CV ECHO MEAS - AO MEAN PG: 16.3 MMHG
BH CV ECHO MEAS - AO ROOT DIAM: 3.2 CM
BH CV ECHO MEAS - AO V2 MAX: 283 CM/SEC
BH CV ECHO MEAS - AO V2 VTI: 58.5 CM
BH CV ECHO MEAS - AVA(I,D): 1.37 CM2
BH CV ECHO MEAS - EDV(CUBED): 105.2 ML
BH CV ECHO MEAS - EDV(MOD-SP2): 78.1 ML
BH CV ECHO MEAS - EDV(MOD-SP4): 78.2 ML
BH CV ECHO MEAS - EF(MOD-BP): 62.4 %
BH CV ECHO MEAS - EF(MOD-SP2): 55.1 %
BH CV ECHO MEAS - EF(MOD-SP4): 69.7 %
BH CV ECHO MEAS - ESV(CUBED): 16.6 ML
BH CV ECHO MEAS - ESV(MOD-SP2): 35.1 ML
BH CV ECHO MEAS - ESV(MOD-SP4): 23.7 ML
BH CV ECHO MEAS - FS: 46 %
BH CV ECHO MEAS - IVS/LVPW: 1.09 CM
BH CV ECHO MEAS - IVSD: 1.49 CM
BH CV ECHO MEAS - LA DIMENSION: 4.6 CM
BH CV ECHO MEAS - LAT PEAK E' VEL: 8.1 CM/SEC
BH CV ECHO MEAS - LV DIASTOLIC VOL/BSA (35-75): 35 CM2
BH CV ECHO MEAS - LV MASS(C)D: 275.5 GRAMS
BH CV ECHO MEAS - LV MAX PG: 5.5 MMHG
BH CV ECHO MEAS - LV MEAN PG: 2 MMHG
BH CV ECHO MEAS - LV SYSTOLIC VOL/BSA (12-30): 10.6 CM2
BH CV ECHO MEAS - LV V1 MAX: 116.3 CM/SEC
BH CV ECHO MEAS - LV V1 VTI: 21.1 CM
BH CV ECHO MEAS - LVIDD: 4.7 CM
BH CV ECHO MEAS - LVIDS: 2.6 CM
BH CV ECHO MEAS - LVOT AREA: 3.8 CM2
BH CV ECHO MEAS - LVOT DIAM: 2.2 CM
BH CV ECHO MEAS - LVPWD: 1.37 CM
BH CV ECHO MEAS - MED PEAK E' VEL: 6.5 CM/SEC
BH CV ECHO MEAS - MV A MAX VEL: 56.6 CM/SEC
BH CV ECHO MEAS - MV DEC TIME: 0.2 SEC
BH CV ECHO MEAS - MV E MAX VEL: 76.7 CM/SEC
BH CV ECHO MEAS - MV E/A: 1.36
BH CV ECHO MEAS - PI END-D VEL: 107 CM/SEC
BH CV ECHO MEAS - RAP SYSTOLE: 3 MMHG
BH CV ECHO MEAS - RVSP: 34.8 MMHG
BH CV ECHO MEAS - SV(LVOT): 80.2 ML
BH CV ECHO MEAS - SV(MOD-SP2): 43 ML
BH CV ECHO MEAS - SV(MOD-SP4): 54.5 ML
BH CV ECHO MEAS - SVI(LVOT): 35.9 ML/M2
BH CV ECHO MEAS - SVI(MOD-SP2): 19.3 ML/M2
BH CV ECHO MEAS - SVI(MOD-SP4): 24.4 ML/M2
BH CV ECHO MEAS - TR MAX PG: 31.8 MMHG
BH CV ECHO MEAS - TR MAX VEL: 282 CM/SEC
BH CV ECHO MEASUREMENTS AVERAGE E/E' RATIO: 10.51
BH CV XLRA - RV BASE: 4 CM
BH CV XLRA - RV LENGTH: 6.5 CM
BH CV XLRA - RV MID: 2.9 CM
BILIRUB SERPL-MCNC: 0.3 MG/DL (ref 0–1.2)
BUN SERPL-MCNC: 26 MG/DL (ref 8–23)
BUN/CREAT SERPL: 16.6 (ref 7–25)
CALCIUM SPEC-SCNC: 8.4 MG/DL (ref 8.6–10.5)
CHLORIDE SERPL-SCNC: 104 MMOL/L (ref 98–107)
CHOLEST SERPL-MCNC: 103 MG/DL (ref 0–200)
CO2 SERPL-SCNC: 30 MMOL/L (ref 22–29)
CREAT SERPL-MCNC: 1.57 MG/DL (ref 0.76–1.27)
DEPRECATED RDW RBC AUTO: 55.2 FL (ref 37–54)
EGFRCR SERPLBLD CKD-EPI 2021: 42.1 ML/MIN/1.73
EOSINOPHIL # BLD AUTO: 0.08 10*3/MM3 (ref 0–0.4)
EOSINOPHIL NFR BLD AUTO: 1.8 % (ref 0.3–6.2)
ERYTHROCYTE [DISTWIDTH] IN BLOOD BY AUTOMATED COUNT: 15.9 % (ref 12.3–15.4)
GLOBULIN UR ELPH-MCNC: 3.1 GM/DL
GLUCOSE SERPL-MCNC: 101 MG/DL (ref 65–99)
HBA1C MFR BLD: 5.6 % (ref 4.8–5.6)
HCT VFR BLD AUTO: 30.3 % (ref 37.5–51)
HDLC SERPL-MCNC: 36 MG/DL (ref 40–60)
HGB BLD-MCNC: 9.5 G/DL (ref 13–17.7)
IMM GRANULOCYTES # BLD AUTO: 0.01 10*3/MM3 (ref 0–0.05)
IMM GRANULOCYTES NFR BLD AUTO: 0.2 % (ref 0–0.5)
L PNEUMO1 AG UR QL IA: NEGATIVE
LDLC SERPL CALC-MCNC: 54 MG/DL (ref 0–100)
LDLC/HDLC SERPL: 1.54 {RATIO}
LEFT ATRIUM VOLUME INDEX: 36.5 ML/M2
LEFT ATRIUM VOLUME: 81.3 ML
LYMPHOCYTES # BLD AUTO: 0.86 10*3/MM3 (ref 0.7–3.1)
LYMPHOCYTES NFR BLD AUTO: 19.1 % (ref 19.6–45.3)
MCH RBC QN AUTO: 29.5 PG (ref 26.6–33)
MCHC RBC AUTO-ENTMCNC: 31.4 G/DL (ref 31.5–35.7)
MCV RBC AUTO: 94.1 FL (ref 79–97)
MONOCYTES # BLD AUTO: 0.42 10*3/MM3 (ref 0.1–0.9)
MONOCYTES NFR BLD AUTO: 9.3 % (ref 5–12)
NEUTROPHILS NFR BLD AUTO: 3.11 10*3/MM3 (ref 1.7–7)
NEUTROPHILS NFR BLD AUTO: 68.9 % (ref 42.7–76)
NRBC BLD AUTO-RTO: 0 /100 WBC (ref 0–0.2)
PLATELET # BLD AUTO: 219 10*3/MM3 (ref 140–450)
PMV BLD AUTO: 11.1 FL (ref 6–12)
POTASSIUM SERPL-SCNC: 4.5 MMOL/L (ref 3.5–5.2)
PROCALCITONIN SERPL-MCNC: 0.07 NG/ML (ref 0–0.25)
PROT SERPL-MCNC: 6.2 G/DL (ref 6–8.5)
QT INTERVAL: 456 MS
QTC INTERVAL: 474 MS
RBC # BLD AUTO: 3.22 10*6/MM3 (ref 4.14–5.8)
S PNEUM AG SPEC QL LA: NEGATIVE
SODIUM SERPL-SCNC: 141 MMOL/L (ref 136–145)
TRIGL SERPL-MCNC: 58 MG/DL (ref 0–150)
TSH SERPL DL<=0.05 MIU/L-ACNC: 0.7 UIU/ML (ref 0.27–4.2)
VLDLC SERPL-MCNC: 13 MG/DL (ref 5–40)
WBC NRBC COR # BLD AUTO: 4.51 10*3/MM3 (ref 3.4–10.8)

## 2024-09-01 PROCEDURE — 94799 UNLISTED PULMONARY SVC/PX: CPT

## 2024-09-01 PROCEDURE — 84145 PROCALCITONIN (PCT): CPT | Performed by: NURSE PRACTITIONER

## 2024-09-01 PROCEDURE — 97165 OT EVAL LOW COMPLEX 30 MIN: CPT

## 2024-09-01 PROCEDURE — 85025 COMPLETE CBC W/AUTO DIFF WBC: CPT | Performed by: FAMILY MEDICINE

## 2024-09-01 PROCEDURE — 97535 SELF CARE MNGMENT TRAINING: CPT

## 2024-09-01 PROCEDURE — 87449 NOS EACH ORGANISM AG IA: CPT | Performed by: FAMILY MEDICINE

## 2024-09-01 PROCEDURE — 94664 DEMO&/EVAL PT USE INHALER: CPT

## 2024-09-01 PROCEDURE — 80053 COMPREHEN METABOLIC PANEL: CPT | Performed by: FAMILY MEDICINE

## 2024-09-01 PROCEDURE — 87899 AGENT NOS ASSAY W/OPTIC: CPT | Performed by: FAMILY MEDICINE

## 2024-09-01 PROCEDURE — 25010000002 PIPERACILLIN SOD-TAZOBACTAM PER 1 G: Performed by: FAMILY MEDICINE

## 2024-09-01 PROCEDURE — 80061 LIPID PANEL: CPT | Performed by: FAMILY MEDICINE

## 2024-09-01 PROCEDURE — 93306 TTE W/DOPPLER COMPLETE: CPT

## 2024-09-01 PROCEDURE — 93306 TTE W/DOPPLER COMPLETE: CPT | Performed by: INTERNAL MEDICINE

## 2024-09-01 PROCEDURE — 84443 ASSAY THYROID STIM HORMONE: CPT | Performed by: FAMILY MEDICINE

## 2024-09-01 PROCEDURE — 92610 EVALUATE SWALLOWING FUNCTION: CPT

## 2024-09-01 PROCEDURE — 93880 EXTRACRANIAL BILAT STUDY: CPT

## 2024-09-01 PROCEDURE — 93880 EXTRACRANIAL BILAT STUDY: CPT | Performed by: SURGERY

## 2024-09-01 PROCEDURE — 83036 HEMOGLOBIN GLYCOSYLATED A1C: CPT | Performed by: FAMILY MEDICINE

## 2024-09-01 PROCEDURE — 97161 PT EVAL LOW COMPLEX 20 MIN: CPT | Performed by: PHYSICAL THERAPIST

## 2024-09-01 RX ORDER — DOXYCYCLINE 100 MG/1
100 TABLET ORAL EVERY 12 HOURS SCHEDULED
Status: CANCELLED | OUTPATIENT
Start: 2024-09-02 | End: 2024-09-07

## 2024-09-01 RX ADMIN — MEMANTINE HYDROCHLORIDE 10 MG: 5 TABLET, FILM COATED ORAL at 08:33

## 2024-09-01 RX ADMIN — TAMSULOSIN HYDROCHLORIDE 0.4 MG: 0.4 CAPSULE ORAL at 08:33

## 2024-09-01 RX ADMIN — ALBUTEROL SULFATE 1.25 MG: 2.5 SOLUTION RESPIRATORY (INHALATION) at 07:10

## 2024-09-01 RX ADMIN — PIPERACILLIN SODIUM AND TAZOBACTAM SODIUM 3.38 G: 3; .375 INJECTION, POWDER, LYOPHILIZED, FOR SOLUTION INTRAVENOUS at 02:31

## 2024-09-01 RX ADMIN — BUDESONIDE 0.5 MG: 0.5 INHALANT RESPIRATORY (INHALATION) at 07:10

## 2024-09-01 RX ADMIN — APIXABAN 2.5 MG: 5 TABLET, FILM COATED ORAL at 08:33

## 2024-09-01 RX ADMIN — IPRATROPIUM BROMIDE 0.5 MG: 0.5 SOLUTION RESPIRATORY (INHALATION) at 19:47

## 2024-09-01 RX ADMIN — ALBUTEROL SULFATE 1.25 MG: 2.5 SOLUTION RESPIRATORY (INHALATION) at 10:37

## 2024-09-01 RX ADMIN — FAMOTIDINE 20 MG: 20 TABLET, FILM COATED ORAL at 08:30

## 2024-09-01 RX ADMIN — BUDESONIDE 0.5 MG: 0.5 INHALANT RESPIRATORY (INHALATION) at 19:47

## 2024-09-01 RX ADMIN — IPRATROPIUM BROMIDE 0.5 MG: 0.5 SOLUTION RESPIRATORY (INHALATION) at 14:24

## 2024-09-01 RX ADMIN — DOCUSATE SODIUM 50 MG AND SENNOSIDES 8.6 MG 2 TABLET: 8.6; 5 TABLET, FILM COATED ORAL at 20:44

## 2024-09-01 RX ADMIN — LEVOTHYROXINE SODIUM 125 MCG: 125 TABLET ORAL at 08:33

## 2024-09-01 RX ADMIN — MONTELUKAST SODIUM 10 MG: 10 TABLET, FILM COATED ORAL at 20:44

## 2024-09-01 RX ADMIN — ALLOPURINOL 100 MG: 100 TABLET ORAL at 08:33

## 2024-09-01 RX ADMIN — ATORVASTATIN CALCIUM 20 MG: 10 TABLET, FILM COATED ORAL at 08:33

## 2024-09-01 RX ADMIN — AMIODARONE HYDROCHLORIDE 200 MG: 200 TABLET ORAL at 08:33

## 2024-09-01 RX ADMIN — ALBUTEROL SULFATE 1.25 MG: 2.5 SOLUTION RESPIRATORY (INHALATION) at 14:24

## 2024-09-01 RX ADMIN — GABAPENTIN 300 MG: 300 CAPSULE ORAL at 20:44

## 2024-09-01 RX ADMIN — Medication 10 ML: at 08:33

## 2024-09-01 RX ADMIN — PIPERACILLIN SODIUM AND TAZOBACTAM SODIUM 3.38 G: 3; .375 INJECTION, POWDER, LYOPHILIZED, FOR SOLUTION INTRAVENOUS at 13:39

## 2024-09-01 RX ADMIN — IPRATROPIUM BROMIDE 0.5 MG: 0.5 SOLUTION RESPIRATORY (INHALATION) at 10:36

## 2024-09-01 RX ADMIN — IPRATROPIUM BROMIDE 0.5 MG: 0.5 SOLUTION RESPIRATORY (INHALATION) at 07:10

## 2024-09-01 RX ADMIN — FAMOTIDINE 20 MG: 20 TABLET, FILM COATED ORAL at 17:29

## 2024-09-01 RX ADMIN — DOCUSATE SODIUM 50 MG AND SENNOSIDES 8.6 MG 2 TABLET: 8.6; 5 TABLET, FILM COATED ORAL at 08:33

## 2024-09-01 RX ADMIN — ALBUTEROL SULFATE 1.25 MG: 2.5 SOLUTION RESPIRATORY (INHALATION) at 19:47

## 2024-09-01 RX ADMIN — Medication 10 ML: at 20:47

## 2024-09-01 RX ADMIN — APIXABAN 2.5 MG: 5 TABLET, FILM COATED ORAL at 20:44

## 2024-09-01 NOTE — CASE MANAGEMENT/SOCIAL WORK
Discharge Planning Assessment   [place]     Patient Name: Rubin Martell  MRN: [id number]  Today's Date: 9/1/2024    Admit Date: 8/31/2024    Plan: Referral to [facility]   Discharge Needs Assessment       Row Name 09/01/24 1001       Living Environment    People in Home spouse    Name(s) of People in Home WifeShireen Wood    Current Living Arrangements home    Potentially Unsafe Housing Conditions none    In the past 12 months has the electric, gas, oil, or water company threatened to shut off services in your home? No    Primary Care Provided by self    Provides Primary Care For no one, unable/limited ability to care for self    Family Caregiver if Needed none    Quality of Family Relationships supportive;helpful;involved    Able to Return to Prior Arrangements no       Resource/Environmental Concerns    Resource/Environmental Concerns none       Transportation Needs    In the past 12 months, has lack of transportation kept you from medical appointments or from getting medications? no    In the past 12 months, has lack of transportation kept you from meetings, work, or from getting things needed for daily living? No       Food Insecurity    Within the past 12 months, you worried that your food would run out before you got the money to buy more. Never true    Within the past 12 months, the food you bought just didn't last and you didn't have money to get more. Never true       Transition Planning    Patient/Family Anticipates Transition to inpatient rehabilitation facility    Patient/Family Anticipated Services at Transition rehabilitation services       Discharge Needs Assessment    Readmission Within the Last 30 Days no previous admission in last 30 days    Current Outpatient/Agency/Support Group homecare agency    Equipment Currently Used at Home cane, straight;rollator;commode    Concerns to be Addressed basic needs;discharge planning    Anticipated Changes Related to Illness inability to care for self     Outpatient/Agency/Support Group Needs inpatient rehabilitation facility    Discharge Facility/Level of Care Needs rehabilitation facility                   Discharge Plan       Row Name 09/01/24 1014       Plan    Plan Referral to [facility]    Patient/Family in Agreement with Plan yes    Plan Comments Spoke with pt's wife to assess for d/c needs. Pt lives at home with wife normally, son assist them both with transport when needed. Pt has RX coverage/PCP.  Wife saying pt has [facility] but they were close to discharging patient, will need to verify during normal business hours.  They have interest in [facility] if qualifies, will wait for both therapies to see pt before referral given.  Pt does have a PCP/RX coverage. Will follow.                  Continued Care and Services - Admitted Since 8/31/2024    No active coordination exists for this encounter.          Demographic Summary    No documentation.                  Functional Status    No documentation.                  Psychosocial    No documentation.                  Abuse/Neglect    No documentation.                  Legal    No documentation.                  Substance Abuse    No documentation.                  Patient Forms    No documentation.                     SVITLANA Gyoal

## 2024-09-01 NOTE — THERAPY EVALUATION
Acute Care - Occupational Therapy Initial Evaluation  [facility]     Patient Name: Rubin Martell  : 1935  MRN: [id number]  Today's Date: 2024  Onset of Illness/Injury or Date of Surgery: 24     Referring Physician: Dr. Millard    Admit Date: 2024       ICD-10-CM ICD-9-CM   1. Syncope, unspecified syncope type  R55 780.2   2. Pneumonia of both lower lobes due to infectious organism  J18.9 486     Patient Active Problem List   Diagnosis    Pneumonia    Atrial fib/flutter, transient    Dementia    Arthritis    GERD (gastroesophageal reflux disease)    Enlarged prostate    H/O total knee replacement    [employer] (hard of hearing)    Hypertension    Hypothyroidism    Neuropathy    Pacemaker    Renal disorder    Sleep apnea    HCAP (healthcare-associated pneumonia)     Past Medical History:   Diagnosis Date    Arthritis     Atrial fib/flutter, transient     Dementia     Enlarged prostate     GERD (gastroesophageal reflux disease)     H/O total knee replacement     History of colonoscopy     [employer] (hard of hearing)     Hypertension     Hypothyroidism     Neuropathy     Pacemaker     Renal disorder     Sleep apnea      History reviewed. No pertinent surgical history.      OT ASSESSMENT FLOWSHEET (Last 12 Hours)       OT Evaluation and Treatment       Row Name 24 0905                   OT Time and Intention    Subjective Information complains of;pain  -AC        Document Type evaluation  -AC        Mode of Treatment occupational therapy  -AC           General Information    Patient Profile Reviewed yes  -AC        Onset of Illness/Injury or Date of Surgery 24  -AC        Referring Physician Dr. Millard  -AC        Prior Level of Function independent:;all household mobility;gait;transfer;bed mobility;ADL's;home management;cooking;cleaning;driving;shopping  uses cane for gait  -AC        Equipment Currently Used at Home cane, straight;walker, rolling;shower chair;raised toilet  -AC        Pertinent History  of Current Functional Problem syncopal event, was recently treated for pneumonia; Dx: HCAP, PMH: dementia  -AC        Existing Precautions/Restrictions fall  -AC        Risks Reviewed LOB;increased discomfort;change in vital signs;lines disloged;patient:  -AC        Benefits Reviewed improve function;increase independence;increase strength;increase balance;decrease pain;decrease risk of DVT;improve skin integrity;increase knowledge;patient:  -AC        Barriers to Rehab none identified  -AC           Living Environment    Current Living Arrangements home  walk in shower  -AC        Home Accessibility stairs to enter home  -AC        People in Home spouse  has caregiver/ about 2-3 days per week  -AC           Home Main Entrance    Number of Stairs, Main Entrance three  -AC        Stair Railings, Main Entrance railing on left side (ascending)  -AC           Pain Assessment    Pretreatment Pain Rating 4/10  -AC        Pain Location - Side/Orientation Bilateral  -AC        Pain Location lower  -AC        Pain Location - foot;back  -AC        Pain Intervention(s) Repositioned;Rest;Ambulation/increased activity  -AC           Cognition    Orientation Status (Cognition) oriented x 4  -AC        Follows Commands (Cognition) WFL  -AC        Personal Safety Interventions fall prevention program maintained;gait belt;muscle strengthening facilitated;nonskid shoes/slippers when out of bed;supervised activity;toileting scheduled  -AC           Range of Motion Comprehensive    General Range of Motion bilateral upper extremity ROM WFL  -AC           Strength Comprehensive (MMT)    Comment, General Manual Muscle Testing (MMT) Assessment 4+/5 BUE  -AC           Activities of Daily Living    BADL Assessment/Intervention lower body dressing;toileting;grooming  -AC           Lower Body Dressing Assessment/Training    [place] Level (Lower Body Dressing) don;doff;socks;contact guard assist  -AC        Position (Lower Body  Dressing) edge of bed sitting  -           Grooming Assessment/Training    [place] Level (Grooming) wash face, hands;contact guard assist  -        Position (Grooming) sink side  -           Toileting Assessment/Training    [place] Level (Toileting) toileting skills;supervision  -        Assistive Devices (Toileting) commode  -        Position (Toileting) supported sitting  -           BADL Safety/Performance    Impairments, BADL Safety/Performance balance;pain;endurance/activity tolerance;strength  -           Bed Mobility    Bed Mobility supine-sit  -        Supine-Sit [place] (Bed Mobility) contact guard  -        Assistive Device (Bed Mobility) bed rails;head of bed elevated  -           Functional Mobility    Functional Mobility- Ind. Level contact guard assist  -        Functional Mobility- Device --  -        Functional Mobility- Comment to elevators, back to BR then bed, HHA utilized  -           Transfer Assessment/Treatment    Transfers sit-stand transfer;stand-sit transfer;toilet transfer  -           Sit-Stand Transfer    Sit-Stand [place] (Transfers) minimum assist (75% patient effort)  -           Stand-Sit Transfer    Stand-Sit [place] (Transfers) minimum assist (75% patient effort)  -           Toilet Transfer    Type (Toilet Transfer) sit-stand;stand-sit  -        [place] Level (Toilet Transfer) minimum assist (75% patient effort)  -           Safety Issues, Functional Mobility    Impairments Affecting Function (Mobility) balance;pain;range of motion (ROM);strength;endurance/activity tolerance  -           Balance    Balance Assessment sitting static balance;sitting dynamic balance;standing static balance;standing dynamic balance  -        Static Sitting Balance standby assist  -        Dynamic Sitting Balance standby assist  -        Position, Sitting Balance sitting edge of bed  -        Static Standing Balance contact  guard  -AC        Dynamic Standing Balance contact guard  -AC        Position/Device Used, Standing Balance supported  -AC           Plan of Care Review    Plan of Care Reviewed With patient  -AC        Progress no change  -AC        Outcome Evaluation OT eval completed.  Pt alert and oriented x4.  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  Pt uses cane for ambulation and reports his mobility has declined in the past few weeks due to his recent pneumonia diagnosis.  Pt came to EOB with CGA.  He transferred with Jillian and walked in the hallway with LUE support and CGA.  Pt walks slow and is cautious with his movements.  He donned/doffed socks with CGA and completed toileting and hygiene with S.  OT is indicated to address pt's decreased endurance, strength, balance and safety with ADL.  He is interested in rehab at discharge.  OT in agreement.  -AC           Vital Signs    Pre SpO2 (%) 95  -AC        O2 Delivery Pre Treatment room air  -AC           Positioning and Restraints    Pre-Treatment Position in bed  -AC        Post Treatment Position chair  -AC        In Chair sitting;call light within reach;encouraged to call for assist;legs elevated  -AC           Therapy Assessment/Plan (OT)    OT Diagnosis decreased adl  -AC        Rehab Potential (OT) good, to achieve stated therapy goals  -AC        Criteria for Skilled Therapeutic Interventions Met (OT) yes;meets criteria;skilled treatment is necessary  -AC        Therapy Frequency (OT) 5 times/wk  -AC        Predicted Duration of Therapy Intervention (OT) 10 days  -AC        Activity Limitations Related to Problem List (OT) BADLs not performed adequately or safely  -AC        Planned Therapy Interventions (OT) activity tolerance training;adaptive equipment training;BADL retraining;functional balance retraining;occupation/activity based interventions;patient/caregiver education/training;strengthening  exercise;transfer/mobility retraining  -AC           OT Goals    Transfer Goal Selection (OT) transfer, OT goal 1  -AC        Bathing Goal Selection (OT) bathing, OT goal 1  -AC        Dressing Goal Selection (OT) dressing, OT goal 1  -AC           Transfer Goal 1 (OT)    Activity/Assistive Device (Transfer Goal 1, OT) bed-to-chair/chair-to-bed;toilet;shower chair  -AC        [place] Level/Cues Needed (Transfer Goal 1, OT) standby assist  -AC        Time Frame (Transfer Goal 1, OT) long term goal (LTG);10 days  -AC        Progress/Outcome (Transfer Goal 1, OT) new goal  -AC           Bathing Goal 1 (OT)    Activity/Device (Bathing Goal 1, OT) shower chair;bathing skills, all  -AC        [place] Level/Cues Needed (Bathing Goal 1, OT) standby assist  -AC        Time Frame (Bathing Goal 1, OT) long term goal (LTG);10 days  -AC        Progress/Outcomes (Bathing Goal 1, OT) new goal  -AC           Dressing Goal 1 (OT)    Activity/Device (Dressing Goal 1, OT) lower body dressing  -AC        [place]/Cues Needed (Dressing Goal 1, OT) standby assist  -AC        Time Frame (Dressing Goal 1, OT) long term goal (LTG);10 days  -AC        Progress/Outcome (Dressing Goal 1, OT) new goal  -AC                  User Key  (r) = Recorded By, (t) = Taken By, (c) = Cosigned By      Initials Name Effective Dates    Damir Martinez, OTR/L, CNT 02/03/23 -                      Occupational Therapy Education       Title: PT OT SLP Therapies (Done)       Topic: Occupational Therapy (Done)       Point: ADL training (Done)       Description:   Instruct learner(s) on proper safety adaptation and remediation techniques during self care or transfers.   Instruct in proper use of assistive devices.                  Learning Progress Summary             Patient Acceptance, E, VU by LEON at 9/1/2024 1022                         Point: Home exercise program (Done)       Description:   Instruct learner(s) on appropriate technique for  monitoring, assisting and/or progressing therapeutic exercises/activities.                  Learning Progress Summary             Patient Acceptance, E, VU by  at 9/1/2024 1022                         Point: Body mechanics (Done)       Description:   Instruct learner(s) on proper positioning and spine alignment during self-care, functional mobility activities and/or exercises.                  Learning Progress Summary             Patient Acceptance, E, VU by  at 9/1/2024 1022                                         User Key       Initials Effective Dates Name Provider Type Discipline     02/03/23 -  Damir Marley, OTR/L, CNT Occupational Therapist OT                      OT Recommendation and Plan  Planned Therapy Interventions (OT): activity tolerance training, adaptive equipment training, BADL retraining, functional balance retraining, occupation/activity based interventions, patient/caregiver education/training, strengthening exercise, transfer/mobility retraining  Therapy Frequency (OT): 5 times/wk  Plan of Care Review  Plan of Care Reviewed With: patient  Progress: no change  Outcome Evaluation: OT eval completed.  Pt alert and oriented x4.  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  Pt uses cane for ambulation and reports his mobility has declined in the past few weeks due to his recent pneumonia diagnosis.  Pt came to EOB with CGA.  He transferred with Jillian and walked in the hallway with LUE support and CGA.  Pt walks slow and is cautious with his movements.  He donned/doffed socks with CGA and completed toileting and hygiene with S.  OT is indicated to address pt's decreased endurance, strength, balance and safety with ADL.  He is interested in rehab at discharge.  OT in agreement.  Plan of Care Reviewed With: patient  Outcome Evaluation: OT eval completed.  Pt alert and oriented x4.  Pt lives at home with spouse and is typically  independent, he is somewhat of the caregiver for his spouse.  Pt uses cane for ambulation and reports his mobility has declined in the past few weeks due to his recent pneumonia diagnosis.  Pt came to EOB with CGA.  He transferred with Jillian and walked in the hallway with LUE support and CGA.  Pt walks slow and is cautious with his movements.  He donned/doffed socks with CGA and completed toileting and hygiene with S.  OT is indicated to address pt's decreased endurance, strength, balance and safety with ADL.  He is interested in rehab at discharge.  OT in agreement.     Outcome Measures       Row Name 09/01/24 0905             How much help from another is currently needed...    Putting on and taking off regular lower body clothing? 3  -AC      Bathing (including washing, rinsing, and drying) 3  -AC      Toileting (which includes using toilet bed pan or urinal) 4  -AC      Putting on and taking off regular upper body clothing 4  -AC      Taking care of personal grooming (such as brushing teeth) 3  -AC      Eating meals 4  -AC      AM-PAC 6 Clicks Score (OT) 21  -AC         Functional Assessment    Outcome Measure Options AM-PAC 6 Clicks Daily Activity (OT)  -AC                User Key  (r) = Recorded By, (t) = Taken By, (c) = Cosigned By      Initials Name Provider Type    Damir Martinez, OTR/L, CNT Occupational Therapist                    Time Calculation:    Time Calculation- OT       Row Name 09/01/24 1022             Time Calculation- OT    OT Start Time 0905  -      OT Stop Time 1015  -      OT Time Calculation (min) 70 min  -AC      Total Timed Code Minutes- OT 10 minute(s)  -AC      OT Received On 09/01/24  -      OT Goal Re-Cert Due Date 09/11/24  -         Timed Charges    67902 - OT Self Care/Mgmt Minutes 10  -AC         Total Minutes    Timed Charges Total Minutes 10  -AC       Total Minutes 10  -AC                User Key  (r) = Recorded By, (t) = Taken By, (c) = Cosigned By      Initials  Name Provider Type     Damir Marley OTR/L, NIDHI Occupational Therapist                  Therapy Charges for Today       Code Description Service Date Service Provider Modifiers Qty    25629249829 HC OT EVAL LOW COMPLEXITY 4 9/1/2024 Damir Marley OTR/L, NIDHI GO 1    [id number]  OT SELF CARE/MGMT/TRAIN EA 15 MIN 9/1/2024 Damir Marley OTR/L, NIDHI GO 1                 Damir SLAUGHTER. AMY Marley/L, NIDHI  9/1/2024

## 2024-09-01 NOTE — PLAN OF CARE
Goal Outcome Evaluation:  Plan of Care Reviewed With: patient        Progress: no change  Outcome Evaluation: PT eval completed. Pt alert and oriented x4 with c/o B foot pain. Pt up in chair upon arrival and reports independence at home before hospitalization. Today, pt performs sit to stand and gait training with CGA and quad cane. Pt with generally unsteady gait and demos dec balance with turns. Pt will benefit from skilled PT to improve fxl mob and endurance. Rec d/c SNF      Anticipated Discharge Disposition (PT): skilled nursing facility

## 2024-09-01 NOTE — THERAPY EVALUATION
Acute Care - Speech Language Pathology   Swallow Initial Evaluation [facility]     Patient Name: Rubin Martell  : 1935  MRN: [id number]  Today's Date: 2024  Onset of Illness/Injury or Date of Surgery: 24     Referring Physician: Dr. Millard      Admit Date: 2024    SPEECH-LANGUAGE PATHOLOGY EVALUATION - SWALLOW    Subjective: The patient was seen on this date for a Clinical Swallow evaluation.  Patient was alert and cooperative. Patient is oriented x4.  Significant history: Patient is admitted after a syncopal episode. Patient has HCAP, a fib, dementia, and GERD. Medical history includes arthritis, enlarged prostate, total knee replacement, [place], HTN, HLD, hypothyroidism, neuropathy, pacemaker, renal disorder, and sleep apnea.     Objective: Textures given included thin liquid and regular consistency.    Assessment: Difficulties were noted with thin liquid on occasion per report, although not directly observed this am.   Observations:  Patient oral mech WFL. Natural teeth. Patient describes coughing at times with thin liquids, although not observed on this date. Patient given education and instruction to limit straw use, drink from side of cup, and include a chin tuck to improve swallow safety as needed. Patient able to demonstrate all. Patient also describes reflux complications while sleeping, with feeling stomach contents reflux. Discussed HOB elevation at 30 degrees at all times at least and avoiding meals 2 hours before sleeping. Expressed understanding stated.      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     Recommendations: Diet Textures: thin liquid, regular consistency food.  Medications should be taken whole with thin liquids or applesauce. May have water and ice between meals after oral care, under staff or family supervision and with the recommended strategies for safe swallowing. ST contacted provider re: medical  management of GERD.    Recommended Strategies: Observe reflux precautions, Upright for PO, small bites and sips, may use straw, and alternate liquids and solids. Oral care before breakfast, after all meals and PRN.    Other Recommended Evaluations: Re-evaluation at bedside. Patient is agreeable to outpatient speech therapy for strengthening and education.      Dysphagia therapy is recommended. Rationale: safe diet tolerance.    Monika Rinaldi SLP 9/1/2024 10:45 CDT      Visit Dx:     ICD-10-CM ICD-9-CM   1. Syncope, unspecified syncope type  R55 780.2   2. Pneumonia of both lower lobes due to infectious organism  J18.9 486   3. Pharyngeal dysphagia  R13.13 787.23     Patient Active Problem List   Diagnosis    Pneumonia    Atrial fib/flutter, transient    Dementia    Arthritis    GERD (gastroesophageal reflux disease)    Enlarged prostate    H/O total knee replacement    [place] (hard of hearing)    Hypertension    Hypothyroidism    Neuropathy    Pacemaker    Renal disorder    Sleep apnea    HCAP (healthcare-associated pneumonia)     Past Medical History:   Diagnosis Date    Arthritis     Atrial fib/flutter, transient     Dementia     Enlarged prostate     GERD (gastroesophageal reflux disease)     H/O total knee replacement     History of colonoscopy     [place] (hard of hearing)     Hypertension     Hypothyroidism     Neuropathy     Pacemaker     Renal disorder     Sleep apnea      History reviewed. No pertinent surgical history.    SLP Recommendation and Plan  SLP Swallowing Diagnosis: suspect acute-on-chronic, mild, pharyngeal dysphagia (09/01/24 0745)  SLP Diet Recommendation: regular textures, thin liquids (09/01/24 0745)  Recommended Precautions and Strategies: upright posture during/after eating, small bites of food and sips of liquid, alternate between small bites of food and sips of liquid, hard swallow with each bite or sip, chin tuck, general aspiration precautions, reflux precautions (09/01/24 0745)  SLP Rec.  for Method of Medication Administration: meds whole, with puree, with thin liquids (09/01/24 0745)     Monitor for Signs of Aspiration: yes, notify SLP if any concerns (09/01/24 0745)  Recommended Diagnostics: reassess via clinical swallow evaluation (09/01/24 0745)  Swallow Criteria for Skilled Therapeutic Interventions Met: demonstrates skilled criteria (09/01/24 0745)  Anticipated Discharge Disposition (SLP): unknown (09/01/24 0745)  Rehab Potential/Prognosis, Swallowing: good, to achieve stated therapy goals (09/01/24 0745)  Therapy Frequency (Swallow): PRN (09/01/24 0745)  Predicted Duration Therapy Intervention (Days): until discharge (09/01/24 0745)  Oral Care Recommendations: Oral Care before breakfast, after meals and PRN (09/01/24 0745)                                        Plan of Care Reviewed With: patient, caregiver  Progress: improving      SWALLOW EVALUATION (Last 72 Hours)       SLP Adult Swallow Evaluation       Row Name 09/01/24 0745                   Rehab Evaluation    Document Type evaluation  -MD        Subjective Information no complaints  -MD        Patient Observations alert;cooperative;agree to therapy  -MD        Patient/Family/Caregiver Comments/Observations No family present.  -MD        Patient Effort good  -MD        Symptoms Noted During/After Treatment none  -MD           General Information    Patient Profile Reviewed yes  -MD        Pertinent History Of Current Problem Patient is admitted after a syncopal episode. Patient has HCAP, a fib, dementia, and GERD. Medical history includes arthritis, enlarged prostate, total knee replacement, [place], HTN, HLD, hypothyroidism, neuropathy, pacemaker, renal disorder, and sleep apnea.  -MD        Current Method of Nutrition regular textures;thin liquids  -MD        Precautions/Limitations, Vision WFL;for purposes of eval  -MD        Precautions/Limitations, Hearing hearing impairment, bilaterally  -MD        Prior Level of  Function-Communication unknown  -MD        Prior Level of Function-Swallowing no diet consistency restrictions  -MD        Plans/Goals Discussed with patient  -MD        Barriers to Rehab none identified  -MD        Patient's Goals for Discharge return home  -MD           Pain    Additional Documentation Pain Scale: Numbers Pre/Post-Treatment (Group)  -MD           Pain Scale: Numbers Pre/Post-Treatment    Pretreatment Pain Rating 0/10 - no pain  -MD        Posttreatment Pain Rating 0/10 - no pain  -MD           Oral Motor Structure and Function    Dentition Assessment natural, present and adequate  -MD        Secretion Management WNL/WFL  -MD        Mucosal Quality dry  -MD           Oral Musculature and Cranial Nerve Assessment    Oral Motor General Assessment WFL  -MD           General Eating/Swallowing Observations    Respiratory Support Currently in Use room air  -MD        Eating/Swallowing Skills self-fed  -MD        Positioning During Eating upright in bed  -MD        Utensils Used spoon;straw  -MD        Consistencies Trialed regular textures;thin liquids  -MD           Respiratory    Respiratory Status WFL  -MD           Clinical Swallow Eval    Oral Prep Phase WFL  -MD        Oral Transit WFL  -MD        Oral Residue WFL  -MD        Pharyngeal Phase suspected pharyngeal impairment  -MD        Esophageal Phase unremarkable  -MD        Clinical Swallow Evaluation Summary see note  -MD           Pharyngeal Phase Concerns    Pharyngeal Phase Concerns multiple swallows  -MD        Multiple Swallows thin  -MD           SLP Evaluation Clinical Impression    SLP Swallowing Diagnosis suspect acute-on-chronic;mild;pharyngeal dysphagia  -MD        Functional Impact risk of aspiration/pneumonia  -MD        Rehab Potential/Prognosis, Swallowing good, to achieve stated therapy goals  -MD        Swallow Criteria for Skilled Therapeutic Interventions Met demonstrates skilled criteria  -MD           Recommendations     Therapy Frequency (Swallow) PRN  -MD        Predicted Duration Therapy Intervention (Days) until discharge  -MD        SLP Diet Recommendation regular textures;thin liquids  -MD        Recommended Diagnostics reassess via clinical swallow evaluation  -MD        Recommended Precautions and Strategies upright posture during/after eating;small bites of food and sips of liquid;alternate between small bites of food and sips of liquid;hard swallow with each bite or sip;chin tuck;general aspiration precautions;reflux precautions  -MD        Oral Care Recommendations Oral Care before breakfast, after meals and PRN  -MD        SLP Rec. for Method of Medication Administration meds whole;with puree;with thin liquids  -MD        Monitor for Signs of Aspiration yes;notify SLP if any concerns  -MD        Anticipated Discharge Disposition (SLP) unknown  -MD           Swallow Goals (SLP)    Swallow LTGs Patient will demonstrate functional swallow for  -MD        Swallow STGs diet tolerance goal selection (SLP)  -MD        Diet Tolerance Goal Selection (SLP) Patient will tolerate trials of  -MD           (LTG) Patient will demonstrate functional swallow for    Diet Texture (Demonstrate functional swallow) regular textures  -MD        Liquid viscosity (Demonstrate functional swallow) thin liquids  -MD        [place] (Demonstrate functional swallow) independently (over 90% accuracy)  -MD        Time Frame (Demonstrate functional swallow) by discharge  -MD        Barriers (Demonstrate functional swallow) n/a  -MD        Progress/Outcomes (Demonstrate functional swallow) new goal  -MD           (STG) Patient will tolerate trials of    Consistencies Trialed (Tolerate trials) regular textures;thin liquids  -MD        Desired Outcome (Tolerate trials) without signs/symptoms of aspiration  -MD        [place] (Tolerate trials) independently (over 90% accuracy)  -MD        Time Frame (Tolerate trials) by discharge  -MD         Progress/Outcomes (Tolerate trials) new goal  -MD                  User Key  (r) = Recorded By, (t) = Taken By, (c) = Cosigned By      Initials Name Effective Dates    Monika Watters, SLP 06/21/22 -                     EDUCATION  The patient has been educated in the following areas:   Dysphagia (Swallowing Impairment).        SLP GOALS       Row Name 09/01/24 0745             (LTG) Patient will demonstrate functional swallow for    Diet Texture (Demonstrate functional swallow) regular textures  -MD      Liquid viscosity (Demonstrate functional swallow) thin liquids  -MD      [place] (Demonstrate functional swallow) independently (over 90% accuracy)  -MD      Time Frame (Demonstrate functional swallow) by discharge  -MD      Barriers (Demonstrate functional swallow) n/a  -MD      Progress/Outcomes (Demonstrate functional swallow) new goal  -MD         (STG) Patient will tolerate trials of    Consistencies Trialed (Tolerate trials) regular textures;thin liquids  -MD      Desired Outcome (Tolerate trials) without signs/symptoms of aspiration  -MD      [place] (Tolerate trials) independently (over 90% accuracy)  -MD      Time Frame (Tolerate trials) by discharge  -MD      Progress/Outcomes (Tolerate trials) new goal  -MD                User Key  (r) = Recorded By, (t) = Taken By, (c) = Cosigned By      Initials Name Provider Type    Monika Watters, SLP Speech and Language Pathologist                         Time Calculation:    Time Calculation- SLP       Row Name 09/01/24 1043             Time Calculation- SLP    SLP Start Time 0745  -MD      SLP Stop Time 0853  -MD      SLP Time Calculation (min) 68 min  -MD      SLP Received On 09/01/24  -MD      SLP Goal Re-Cert Due Date 09/11/24  -MD         Untimed Charges    12370-PM Eval Oral Pharyng Swallow Minutes 68  -MD         Total Minutes    Untimed Charges Total Minutes 68  -MD       Total Minutes 68  -MD                User Key  (r) = Recorded By, (t) =  Taken By, (c) = Cosigned By      Initials Name Provider Type    Monika Watters, SLP Speech and Language Pathologist                    Therapy Charges for Today       Code Description Service Date Service Provider Modifiers Qty    [id number]  ST EVAL ORAL PHARYNG SWALLOW 5 9/1/2024 Monika Rinaldi, SLP GN 1                 JACKIE Pleitez  9/1/2024
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
## 2024-09-01 NOTE — PLAN OF CARE
Goal Outcome Evaluation:  Plan of Care Reviewed With: patient        Progress: improving  Outcome Evaluation: IV ANTIBIOTICS PER ORDER. PT DEPT.  WORKS WITH PT. PT. ONLY VOIDED 225 ML.  BLADDER SCAN DONE = 440 ML.  PREM JC AWARE. NURSING STAFF TO MEASURE POST VOIDING RESIDUAL EVERY SHIFT. NO DISTRESS NOTED.

## 2024-09-01 NOTE — PLAN OF CARE
Goal Outcome Evaluation:           Progress: no change          Pt with minimal complaints of pain so far during shift. IV abx infused per order. Tele on. SCDs and Eliquis for VTE prevention. Pt unable to void during shift; straight cathed preformed. VSS. Safety maintained.

## 2024-09-01 NOTE — THERAPY EVALUATION
Patient Name: Rubin Martell  : 1935    MRN: [id number]                              Today's Date: 2024       Admit Date: 2024    Visit Dx:     ICD-10-CM ICD-9-CM   1. Syncope, unspecified syncope type  R55 780.2   2. Pneumonia of both lower lobes due to infectious organism  J18.9 486   3. Pharyngeal dysphagia  R13.13 787.23   4. Decreased functional mobility and endurance [Z74.09]  Z74.09 780.99     Patient Active Problem List   Diagnosis    Pneumonia    Atrial fib/flutter, transient    Dementia    Arthritis    GERD (gastroesophageal reflux disease)    Enlarged prostate    H/O total knee replacement    [place] (hard of hearing)    Hypertension    Hypothyroidism    Neuropathy    Pacemaker    Renal disorder    Sleep apnea    HCAP (healthcare-associated pneumonia)    Vasovagal syncope     Past Medical History:   Diagnosis Date    Arthritis     Atrial fib/flutter, transient     Dementia     Enlarged prostate     GERD (gastroesophageal reflux disease)     H/O total knee replacement     History of colonoscopy     [place] (hard of hearing)     Hypertension     Hypothyroidism     Neuropathy     Pacemaker     Renal disorder     Sleep apnea      History reviewed. No pertinent surgical history.   General Information       Row Name 24 1500          Physical Therapy Time and Intention    Document Type evaluation  syncopal event, was recently treated for pneumonia; Dx: HCAP, PMH: dementia  -SB     Mode of Treatment physical therapy  -SB       Row Name 24 1500          General Information    Patient Profile Reviewed yes  -SB     Prior Level of Function independent:;all household mobility;ADL's;home management;cooking;cleaning;driving;shopping  uses cane  -SB     Existing Precautions/Restrictions fall  -SB     Barriers to Rehab none identified  -SB       Row Name 24 1500          Living Environment    People in Home spouse  -SB       Row Name 24 1500          Home Main Entrance    Number of Stairs, Main  Entrance three  -SB     Stair Railings, Main Entrance railing on left side (ascending)  -SB       Row Name 09/01/24 1500          Stairs Within Home, Primary    Number of Stairs, Within Home, Primary none  -SB       Row Name 09/01/24 1500          Cognition    Orientation Status (Cognition) oriented x 4  -SB       Row Name 09/01/24 1500          Safety Issues, Functional Mobility    Impairments Affecting Function (Mobility) balance;pain;range of motion (ROM);strength;endurance/activity tolerance  -SB               User Key  (r) = Recorded By, (t) = Taken By, (c) = Cosigned By      Initials Name Provider Type    SB Maribell Anaya, PT DPT Physical Therapist                   Mobility       Row Name 09/01/24 1500          Bed Mobility    Bed Mobility --  -SB     Comment, (Bed Mobility) sitting up in chair  -SB       Row Name 09/01/24 1500          Sit-Stand Transfer    Sit-Stand [place] (Transfers) contact guard  -SB     Assistive Device (Sit-Stand Transfers) cane, quad  -SB       Row Name 09/01/24 1500          Gait/Stairs (Locomotion)    [place] Level (Gait) contact guard  -SB     Assistive Device (Gait) cane, quad  -SB     Patient was able to Ambulate yes  -SB     Distance in Feet (Gait) 50  -SB     Deviations/Abnormal Patterns (Gait) gait speed decreased  -SB     Bilateral Gait Deviations forward flexed posture  -SB     Comment, (Gait/Stairs) decreased balance during turn  -SB               User Key  (r) = Recorded By, (t) = Taken By, (c) = Cosigned By      Initials Name Provider Type    SB Maribell Anaya, PT DPT Physical Therapist                   Obj/Interventions       Row Name 09/01/24 1500          Range of Motion Comprehensive    General Range of Motion bilateral lower extremity ROM WFL  -SB       Row Name 09/01/24 1500          Strength Comprehensive (MMT)    General Manual Muscle Testing (MMT) Assessment lower extremity strength deficits identified  -SB     Comment, General Manual Muscle Testing  (MMT) Assessment BLE functionally 4/5  -SB       Row Name 09/01/24 1500          Balance    Balance Assessment sitting static balance;sitting dynamic balance;standing static balance;standing dynamic balance  -SB     Static Sitting Balance supervision  -SB     Dynamic Sitting Balance supervision  -SB     Position, Sitting Balance supported;sitting in chair  -SB     Static Standing Balance contact guard  -SB     Dynamic Standing Balance contact guard  -SB     Position/Device Used, Standing Balance supported;cane, quad  -SB       Row Name 09/01/24 1500          Sensory Assessment (Somatosensory)    Sensory Assessment (Somatosensory) LE sensation intact  -SB               User Key  (r) = Recorded By, (t) = Taken By, (c) = Cosigned By      Initials Name Provider Type    SB Maribell Anaya, PT DPT Physical Therapist                   Goals/Plan       Row Name 09/01/24 1500          Bed Mobility Goal 1 (PT)    Activity/Assistive Device (Bed Mobility Goal 1, PT) sit to supine;supine to sit;rolling to left;rolling to right  -SB     [place] Level/Cues Needed (Bed Mobility Goal 1, PT) independent  -SB     Time Frame (Bed Mobility Goal 1, PT) long term goal (LTG)  -SB     Progress/Outcomes (Bed Mobility Goal 1, PT) new goal  -SB       Row Name 09/01/24 1500          Transfer Goal 1 (PT)    Activity/Assistive Device (Transfer Goal 1, PT) sit-to-stand/stand-to-sit;bed-to-chair/chair-to-bed  -SB     [place] Level/Cues Needed (Transfer Goal 1, PT) independent  -SB     Time Frame (Transfer Goal 1, PT) long term goal (LTG)  -SB     Progress/Outcome (Transfer Goal 1, PT) new goal  -SB       Row Name 09/01/24 1500          Gait Training Goal 1 (PT)    Activity/Assistive Device (Gait Training Goal 1, PT) gait (walking locomotion);increase endurance/gait distance;increase energy conservation;cane, quad  -SB     [place] Level (Gait Training Goal 1, PT) standby assist  -SB     Distance (Gait Training Goal 1, PT) 60  -SB      Time Frame (Gait Training Goal 1, PT) long term goal (LTG)  -SB     Progress/Outcome (Gait Training Goal 1, PT) new goal  -SB       Row Name 09/01/24 1500          Stairs Goal 1 (PT)    Activity/Assistive Device (Stairs Goal 1, PT) --  -SB     [place] Level/Cues Needed (Stairs Goal 1, PT) --  -SB     Number of Stairs (Stairs Goal 1, PT) --  -SB     Time Frame (Stairs Goal 1, PT) --  -SB     Progress/Outcome (Stairs Goal 1, PT) --  -SB       Row Name 09/01/24 1500          Therapy Assessment/Plan (PT)    Planned Therapy Interventions (PT) balance training;strengthening;bed mobility training;gait training;patient/family education;transfer training  -SB               User Key  (r) = Recorded By, (t) = Taken By, (c) = Cosigned By      Initials Name Provider Type    SB Maribell Anaya, PT DPT Physical Therapist                   Clinical Impression       Row Name 09/01/24 1500          Pain    Pretreatment Pain Rating 3/10  -SB     Pain Location - Side/Orientation Bilateral  -SB     Pain Location - foot  -SB     Pain Intervention(s) Medication (See MAR);Repositioned;Ambulation/increased activity  -SB     Additional Documentation Pain Scale: Numbers Pre/Post-Treatment (Group)  -SB       Row Name 09/01/24 1500          Plan of Care Review    Plan of Care Reviewed With patient  -SB     Progress no change  -SB     Outcome Evaluation PT eval completed. Pt alert and oriented x4 with c/o B foot pain. Pt up in chair upon arrival and reports independence at home before hospitalization. Today, pt performs sit to stand and gait training with CGA and quad cane. Pt with generally unsteady gait and demos dec balance with turns. Pt will benefit from skilled PT to improve fxl mob and endurance. Rec d/c SNF  -SB       Row Name 09/01/24 1500          Therapy Assessment/Plan (PT)    Patient/Family Therapy Goals Statement (PT) improve function  -SB     Rehab Potential (PT) good, to achieve stated therapy goals  -SB     Criteria for  Skilled Interventions Met (PT) yes;meets criteria;skilled treatment is necessary  -SB     Therapy Frequency (PT) 2 times/day  -SB     Predicted Duration of Therapy Intervention (PT) until d/c or goals met  -SB       Row Name 09/01/24 1500          Positioning and Restraints    Pre-Treatment Position sitting in chair/recliner  -SB     Post Treatment Position chair  -SB     In Chair reclined;call light within reach;encouraged to call for assist;heels elevated;LLE elevated;RLE elevated  -SB               User Key  (r) = Recorded By, (t) = Taken By, (c) = Cosigned By      Initials Name Provider Type    Maribell Joy, PT DPT Physical Therapist                   Outcome Measures       Row Name 09/01/24 1500 09/01/24 0800       How much help from another person do you currently need...    Turning from your back to your side while in flat bed without using bedrails? 4  -SB 4  -TB    Moving from lying on back to sitting on the side of a flat bed without bedrails? 4  -SB 3  -TB    Moving to and from a bed to a chair (including a wheelchair)? 3  -SB 3  -TB    Standing up from a chair using your arms (e.g., wheelchair, bedside chair)? 3  -SB 3  -TB    Climbing 3-5 steps with a railing? 3  -SB 3  -TB    To walk in hospital room? 3  -SB 3  -TB    AM-PAC 6 Clicks Score (PT) 20  -SB 19  -TB    Highest Level of Mobility Goal 6 --> Walk 10 steps or more  -SB 6 --> Walk 10 steps or more  -TB      Row Name 09/01/24 1500 09/01/24 0905       Functional Assessment    Outcome Measure Options AM-PAC 6 Clicks Basic Mobility (PT)  -SB AM-PAC 6 Clicks Daily Activity (OT)  -AC              User Key  (r) = Recorded By, (t) = Taken By, (c) = Cosigned By      Initials Name Provider Type    Damir Martinez, OTR/L, CNT Occupational Therapist    Janina Oleary LPN Licensed Nurse    Maribell Joy, PT DPT Physical Therapist                                 Physical Therapy Education       Title: PT OT SLP Therapies (In Progress)        Topic: Physical Therapy (In Progress)       Point: Mobility training (Done)       Learning Progress Summary             Patient Acceptance, E, VU by SB at 9/1/2024 1523    Comment: pt edu on POC, benefits of act and d/c plans                         Point: Home exercise program (Not Started)       Learner Progress:  Not documented in this visit.              Point: Body mechanics (Not Started)       Learner Progress:  Not documented in this visit.              Point: Precautions (Done)       Learning Progress Summary             Patient Acceptance, E, VU by SB at 9/1/2024 1523    Comment: pt edu on POC, benefits of act and d/c plans                                         User Key       Initials Effective Dates Name Provider Type Discipline    SB 07/11/23 -  Maribell Anaya, PT DPT Physical Therapist PT                  PT Recommendation and Plan  Planned Therapy Interventions (PT): balance training, strengthening, bed mobility training, gait training, patient/family education, transfer training  Plan of Care Reviewed With: patient  Progress: no change  Outcome Evaluation: PT eval completed. Pt alert and oriented x4 with c/o B foot pain. Pt up in chair upon arrival and reports independence at home before hospitalization. Today, pt performs sit to stand and gait training with CGA and quad cane. Pt with generally unsteady gait and demos dec balance with turns. Pt will benefit from skilled PT to improve fxl mob and endurance. Rec d/c SNF     Time Calculation:         PT Charges       Row Name 09/01/24 1619             Time Calculation    Start Time 1500  -SB      Stop Time 1600  -SB      Time Calculation (min) 60 min  -SB      PT Received On 09/01/24  -SB      PT Goal Re-Cert Due Date 09/11/24  -SB         Untimed Charges    PT Eval/Re-eval Minutes 60  -SB         Total Minutes    Untimed Charges Total Minutes 60  -SB       Total Minutes 60  -SB                User Key  (r) = Recorded By, (t) = Taken By, (c) = Cosigned  By      Initials Name Provider Type    Maribell Joy, PT DPT Physical Therapist                  Therapy Charges for Today       Code Description Service Date Service Provider Modifiers Qty    96435777755 HC PT EVAL LOW COMPLEXITY 4 9/1/2024 Maribell Anaya PT DPT GP 1            PT G-Codes  Outcome Measure Options: AM-PAC 6 Clicks Basic Mobility (PT)  AM-PAC 6 Clicks Score (PT): 20  AM-PAC 6 Clicks Score (OT): 21  PT Discharge Summary  Anticipated Discharge Disposition (PT): skilled nursing facility    Maribell Anaya PT DPT  9/1/2024

## 2024-09-01 NOTE — PLAN OF CARE
Goal Outcome Evaluation:  Plan of Care Reviewed With: patient        Progress: no change  Outcome Evaluation: OT erlin completed.  Pt alert and oriented x4.  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  Pt uses cane for ambulation and reports his mobility has declined in the past few weeks due to his recent pneumonia diagnosis.  Pt came to EOB with CGA.  He transferred with Jillian and walked in the hallway with LUE support and CGA.  Pt walks slow and is cautious with his movements.  He donned/doffed socks with CGA and completed toileting and hygiene with S.  OT is indicated to address pt's decreased endurance, strength, balance and safety with ADL.  He is interested in rehab at discharge.  OT in agreement.

## 2024-09-01 NOTE — PLAN OF CARE
Goal Outcome Evaluation:  Plan of Care Reviewed With: patient, caregiver      Progress: improving     Anticipated Discharge Disposition (SLP): unknown     SLP Swallowing Diagnosis: suspect acute-on-chronic, mild, pharyngeal dysphagia (09/01/24 0745)       SPEECH-LANGUAGE PATHOLOGY EVALUATION - SWALLOW    Subjective: The patient was seen on this date for a Clinical Swallow evaluation.  Patient was alert and cooperative. Patient is oriented x4.  Significant history: Patient is admitted after a syncopal episode. Patient has HCAP, a fib, dementia, and GERD. Medical history includes arthritis, enlarged prostate, total knee replacement, [place], HTN, HLD, hypothyroidism, neuropathy, pacemaker, renal disorder, and sleep apnea.     Objective: Textures given included thin liquid and regular consistency.    Assessment: Difficulties were noted with  thin liquid on occasion per report, although not directly observed this am.   Observations:  Patient oral mech WFL. Natural teeth. Patient describes coughing at times with thin liquids, although not observed on this date. Patient given education and instruction to limit straw use, drink from side of cup, and include a chin tuck to improve swallow safety as needed. Patient able to demonstrate all. Patient also describes reflux complications while sleeping, with feeling stomach contents reflux. Discussed HOB elevation at 30 degrees at all times at least and avoiding meals 2 hours before sleeping. Expressed understanding stated.      SLP Findings:  Patient presents with  suspected chronic mild pharyngeal dysphagia  , with esophageal component.     Recommendations: Diet Textures: thin liquid, regular consistency food.  Medications should be taken whole  with thin liquids or applesauce . May have water and ice between meals after oral care, under staff or family supervision and with the recommended strategies for safe swallowing. ST contacted provider re: medical management of  GERD.    Recommended Strategies:  Observe reflux precautions, Upright for PO, small bites and sips, may use straw, and alternate liquids and solids. Oral care before breakfast, after all meals and PRN.    Other Recommended Evaluations: Re-evaluation at bedside. Patient is agreeable to outpatient speech therapy for strengthening and education.      Dysphagia therapy is recommended. Rationale: safe diet tolerance.     Monika Rinaldi, SLP 9/1/2024 10:40 CDT

## 2024-09-02 PROBLEM — D63.8 ANEMIA OF CHRONIC DISEASE: Status: ACTIVE | Noted: 2024-09-02

## 2024-09-02 PROBLEM — J18.9 PNEUMONIA: Status: RESOLVED | Noted: 2024-08-31 | Resolved: 2024-09-02

## 2024-09-02 PROBLEM — R26.81 GAIT INSTABILITY: Status: ACTIVE | Noted: 2024-09-02

## 2024-09-02 PROBLEM — K59.01 SLOW TRANSIT CONSTIPATION: Status: ACTIVE | Noted: 2024-09-02

## 2024-09-02 LAB
ANION GAP SERPL CALCULATED.3IONS-SCNC: 9 MMOL/L (ref 5–15)
BUN SERPL-MCNC: 27 MG/DL (ref 8–23)
BUN/CREAT SERPL: 13.8 (ref 7–25)
CALCIUM SPEC-SCNC: 8.4 MG/DL (ref 8.6–10.5)
CHLORIDE SERPL-SCNC: 102 MMOL/L (ref 98–107)
CO2 SERPL-SCNC: 29 MMOL/L (ref 22–29)
CREAT SERPL-MCNC: 1.96 MG/DL (ref 0.76–1.27)
DEPRECATED RDW RBC AUTO: 56 FL (ref 37–54)
EGFRCR SERPLBLD CKD-EPI 2021: 32.3 ML/MIN/1.73
ERYTHROCYTE [DISTWIDTH] IN BLOOD BY AUTOMATED COUNT: 16.1 % (ref 12.3–15.4)
FERRITIN SERPL-MCNC: 54.01 NG/ML (ref 30–400)
GLUCOSE SERPL-MCNC: 102 MG/DL (ref 65–99)
HCT VFR BLD AUTO: 29.4 % (ref 37.5–51)
HGB BLD-MCNC: 9.1 G/DL (ref 13–17.7)
IRON 24H UR-MRATE: 34 MCG/DL (ref 59–158)
IRON SATN MFR SERPL: 12 % (ref 20–50)
MCH RBC QN AUTO: 29.5 PG (ref 26.6–33)
MCHC RBC AUTO-ENTMCNC: 31 G/DL (ref 31.5–35.7)
MCV RBC AUTO: 95.5 FL (ref 79–97)
PLATELET # BLD AUTO: 207 10*3/MM3 (ref 140–450)
PMV BLD AUTO: 11.5 FL (ref 6–12)
POTASSIUM SERPL-SCNC: 4.9 MMOL/L (ref 3.5–5.2)
RBC # BLD AUTO: 3.08 10*6/MM3 (ref 4.14–5.8)
RETICS # AUTO: 0.05 10*6/MM3 (ref 0.02–0.13)
RETICS/RBC NFR AUTO: 1.75 % (ref 0.7–1.9)
SODIUM SERPL-SCNC: 140 MMOL/L (ref 136–145)
TIBC SERPL-MCNC: 285 MCG/DL (ref 298–536)
TRANSFERRIN SERPL-MCNC: 191 MG/DL (ref 200–360)
WBC NRBC COR # BLD AUTO: 5.16 10*3/MM3 (ref 3.4–10.8)

## 2024-09-02 PROCEDURE — 94761 N-INVAS EAR/PLS OXIMETRY MLT: CPT

## 2024-09-02 PROCEDURE — 85027 COMPLETE CBC AUTOMATED: CPT | Performed by: NURSE PRACTITIONER

## 2024-09-02 PROCEDURE — 80048 BASIC METABOLIC PNL TOTAL CA: CPT | Performed by: NURSE PRACTITIONER

## 2024-09-02 PROCEDURE — 94664 DEMO&/EVAL PT USE INHALER: CPT

## 2024-09-02 PROCEDURE — 97116 GAIT TRAINING THERAPY: CPT

## 2024-09-02 PROCEDURE — 94799 UNLISTED PULMONARY SVC/PX: CPT

## 2024-09-02 PROCEDURE — 83540 ASSAY OF IRON: CPT | Performed by: NURSE PRACTITIONER

## 2024-09-02 PROCEDURE — 84466 ASSAY OF TRANSFERRIN: CPT | Performed by: NURSE PRACTITIONER

## 2024-09-02 PROCEDURE — 25810000003 SODIUM CHLORIDE 0.9 % SOLUTION: Performed by: NURSE PRACTITIONER

## 2024-09-02 PROCEDURE — 82728 ASSAY OF FERRITIN: CPT | Performed by: NURSE PRACTITIONER

## 2024-09-02 PROCEDURE — 85045 AUTOMATED RETICULOCYTE COUNT: CPT | Performed by: NURSE PRACTITIONER

## 2024-09-02 RX ORDER — SODIUM CHLORIDE 9 MG/ML
50 INJECTION, SOLUTION INTRAVENOUS CONTINUOUS
Status: DISCONTINUED | OUTPATIENT
Start: 2024-09-02 | End: 2024-09-03

## 2024-09-02 RX ORDER — FAMOTIDINE 20 MG/1
20 TABLET, FILM COATED ORAL NIGHTLY
Status: DISCONTINUED | OUTPATIENT
Start: 2024-09-03 | End: 2024-09-04 | Stop reason: HOSPADM

## 2024-09-02 RX ORDER — IPRATROPIUM BROMIDE AND ALBUTEROL SULFATE 2.5; .5 MG/3ML; MG/3ML
3 SOLUTION RESPIRATORY (INHALATION) EVERY 4 HOURS PRN
Status: DISCONTINUED | OUTPATIENT
Start: 2024-09-02 | End: 2024-09-04 | Stop reason: HOSPADM

## 2024-09-02 RX ADMIN — APIXABAN 2.5 MG: 5 TABLET, FILM COATED ORAL at 09:15

## 2024-09-02 RX ADMIN — IPRATROPIUM BROMIDE 0.5 MG: 0.5 SOLUTION RESPIRATORY (INHALATION) at 14:29

## 2024-09-02 RX ADMIN — ALBUTEROL SULFATE 1.25 MG: 2.5 SOLUTION RESPIRATORY (INHALATION) at 10:20

## 2024-09-02 RX ADMIN — Medication 10 ML: at 09:15

## 2024-09-02 RX ADMIN — MEMANTINE HYDROCHLORIDE 10 MG: 5 TABLET, FILM COATED ORAL at 09:15

## 2024-09-02 RX ADMIN — IPRATROPIUM BROMIDE 0.5 MG: 0.5 SOLUTION RESPIRATORY (INHALATION) at 10:20

## 2024-09-02 RX ADMIN — FAMOTIDINE 20 MG: 20 TABLET, FILM COATED ORAL at 17:17

## 2024-09-02 RX ADMIN — IPRATROPIUM BROMIDE 0.5 MG: 0.5 SOLUTION RESPIRATORY (INHALATION) at 06:22

## 2024-09-02 RX ADMIN — SODIUM CHLORIDE 50 ML/HR: 9 INJECTION, SOLUTION INTRAVENOUS at 16:19

## 2024-09-02 RX ADMIN — GABAPENTIN 300 MG: 300 CAPSULE ORAL at 20:28

## 2024-09-02 RX ADMIN — TAMSULOSIN HYDROCHLORIDE 0.4 MG: 0.4 CAPSULE ORAL at 09:15

## 2024-09-02 RX ADMIN — AMIODARONE HYDROCHLORIDE 200 MG: 200 TABLET ORAL at 09:15

## 2024-09-02 RX ADMIN — BUDESONIDE 0.5 MG: 0.5 INHALANT RESPIRATORY (INHALATION) at 20:35

## 2024-09-02 RX ADMIN — ALLOPURINOL 100 MG: 100 TABLET ORAL at 09:15

## 2024-09-02 RX ADMIN — BUDESONIDE 0.5 MG: 0.5 INHALANT RESPIRATORY (INHALATION) at 06:22

## 2024-09-02 RX ADMIN — LEVOTHYROXINE SODIUM 125 MCG: 125 TABLET ORAL at 09:15

## 2024-09-02 RX ADMIN — DOCUSATE SODIUM 50 MG AND SENNOSIDES 8.6 MG 2 TABLET: 8.6; 5 TABLET, FILM COATED ORAL at 09:17

## 2024-09-02 RX ADMIN — ALBUTEROL SULFATE 1.25 MG: 2.5 SOLUTION RESPIRATORY (INHALATION) at 14:29

## 2024-09-02 RX ADMIN — ALBUTEROL SULFATE 1.25 MG: 2.5 SOLUTION RESPIRATORY (INHALATION) at 06:22

## 2024-09-02 RX ADMIN — MONTELUKAST SODIUM 10 MG: 10 TABLET, FILM COATED ORAL at 20:28

## 2024-09-02 RX ADMIN — APIXABAN 2.5 MG: 5 TABLET, FILM COATED ORAL at 20:28

## 2024-09-02 RX ADMIN — FAMOTIDINE 20 MG: 20 TABLET, FILM COATED ORAL at 09:17

## 2024-09-02 RX ADMIN — ATORVASTATIN CALCIUM 20 MG: 10 TABLET, FILM COATED ORAL at 09:15

## 2024-09-02 NOTE — THERAPY TREATMENT NOTE
Acute Care - Physical Therapy Treatment Note  [facility]     Patient Name: Rubin Martell  : 1935  MRN: [id number]  Today's Date: 2024   Onset of Illness/Injury or Date of Surgery: 24  Visit Dx:     ICD-10-CM ICD-9-CM   1. Syncope, unspecified syncope type  R55 780.2   2. Pneumonia of both lower lobes due to infectious organism  J18.9 486   3. Pharyngeal dysphagia  R13.13 787.23   4. Decreased functional mobility and endurance [Z74.09]  Z74.09 780.99     Patient Active Problem List   Diagnosis    Pneumonia    Atrial fib/flutter, transient    Dementia    Arthritis    GERD (gastroesophageal reflux disease)    Enlarged prostate    H/O total knee replacement    [place] (hard of hearing)    Hypertension    Hypothyroidism    Neuropathy    Pacemaker    Renal disorder    Sleep apnea    HCAP (healthcare-associated pneumonia)    Vasovagal syncope     Past Medical History:   Diagnosis Date    Arthritis     Atrial fib/flutter, transient     Dementia     Enlarged prostate     GERD (gastroesophageal reflux disease)     H/O total knee replacement     History of colonoscopy     [place] (hard of hearing)     Hypertension     Hypothyroidism     Neuropathy     Pacemaker     Renal disorder     Sleep apnea      History reviewed. No pertinent surgical history.  PT Assessment (Last 12 Hours)       PT Evaluation and Treatment       Row Name 24 1145          Physical Therapy Time and Intention    Subjective Information complains of;weakness  -NATALIE     Document Type therapy note (daily note)  -NATALIE     Mode of Treatment physical therapy  -       Row Name 24 1145          General Information    Existing Precautions/Restrictions fall  -       Row Name 24 1145          Pain    Pretreatment Pain Rating 3/10  -NATALIE     Posttreatment Pain Rating 3/10  -NATALIE     Pain Location - Side/Orientation Bilateral  -NATALIE     Pain Location lower  -NATALIE     Pain Location - extremity  -NATALIE     Pre/Posttreatment Pain Comment neuropathy  -NATALIE      Pain Intervention(s) Ambulation/increased activity  -NATALIE       Row Name 09/02/24 1145          Bed Mobility    Supine-Sit [place] (Bed Mobility) verbal cues;minimum assist (75% patient effort)  -NATALIE       Row Name 09/02/24 1145          Sit-Stand Transfer    Sit-Stand [place] (Transfers) verbal cues;minimum assist (75% patient effort)  -NATALIE     Assistive Device (Sit-Stand Transfers) cane, quad  -NATALIE       Row Name 09/02/24 1145          Stand-Sit Transfer    Stand-Sit [place] (Transfers) verbal cues;minimum assist (75% patient effort)  -NATALIE       Row Name 09/02/24 1145          Gait/Stairs (Locomotion)    [place] Level (Gait) verbal cues;contact guard;minimum assist (75% patient effort)  -NATALIE     Assistive Device (Gait) cane, quad  -NATALIE     Distance in Feet (Gait) 80  -NATALIE     Deviations/Abnormal Patterns (Gait) gait speed decreased;stride length decreased  -NATALIE     Bilateral Gait Deviations forward flexed posture  -NATALIE     Comment, (Gait/Stairs) pt may benefit from RWX  -NATALIE       Row Name 09/02/24 1145          Positioning and Restraints    Pre-Treatment Position in bed  -NATALIE     Post Treatment Position chair  -NATALIE     In Chair sitting;call light within reach;encouraged to call for assist  -NATALIE               User Key  (r) = Recorded By, (t) = Taken By, (c) = Cosigned By      Initials Name Provider Type    Toney Ferguson, PTA Physical Therapist Assistant                    Physical Therapy Education       Title: PT OT SLP Therapies (In Progress)       Topic: Physical Therapy (In Progress)       Point: Mobility training (Done)       Learning Progress Summary             Patient Acceptance, E, VU by SB at 9/1/2024 9133    Comment: pt edu on POC, benefits of act and d/c plans                         Point: Home exercise program (Not Started)       Learner Progress:  Not documented in this visit.              Point: Body mechanics (Not Started)       Learner Progress:  Not documented in this visit.               Point: Precautions (Done)       Learning Progress Summary             Patient Acceptance, E, VU by SB at 9/1/2024 3987    Comment: pt edu on POC, benefits of act and d/c plans                                         User Key       Initials Effective Dates Name Provider Type Discipline    SB 07/11/23 -  Maribell Anaya, PT DPT Physical Therapist PT                  PT Recommendation and Plan         Outcome Measures       Row Name 09/02/24 1145 09/01/24 0905          How much help from another person do you currently need...    Turning from your back to your side while in flat bed without using bedrails? 4  -NATALIE --     Moving from lying on back to sitting on the side of a flat bed without bedrails? 4  -NATALIE --     Moving to and from a bed to a chair (including a wheelchair)? 3  -NATALIE --     Standing up from a chair using your arms (e.g., wheelchair, bedside chair)? 3  -NATALIE --     Climbing 3-5 steps with a railing? 3  -NATALIE --     To walk in hospital room? 3  -NATALIE --     AM-PAC 6 Clicks Score (PT) 20  -NATALIE --     Highest Level of Mobility Goal 6 --> Walk 10 steps or more  -NATALIE --        How much help from another is currently needed...    Putting on and taking off regular lower body clothing? -- 3  -AC     Bathing (including washing, rinsing, and drying) -- 3  -AC     Toileting (which includes using toilet bed pan or urinal) -- 4  -AC     Putting on and taking off regular upper body clothing -- 4  -AC     Taking care of personal grooming (such as brushing teeth) -- 3  -AC     Eating meals -- 4  -AC     AM-PAC 6 Clicks Score (OT) -- 21  -AC        Functional Assessment    Outcome Measure Options AM-PAC 6 Clicks Basic Mobility (PT)  -NATALIE AM-PAC 6 Clicks Daily Activity (OT)  -AC               User Key  (r) = Recorded By, (t) = Taken By, (c) = Cosigned By      Initials Name Provider Type    Damir Martinez, OTR/L, CNT Occupational Therapist    Toney Ferguson, PTA Physical Therapist Assistant                     Time  Calculation:    PT Charges       Row Name 09/02/24 1145             Time Calculation    Start Time 1145  -NATALIE      Stop Time 1200  -NATALIE      Time Calculation (min) 15 min  -NATALIE      PT Received On 09/02/24  -NATAILE         Time Calculation- PT    Total Timed Code Minutes- PT 15 minute(s)  -NATALIE         Timed Charges    17208 - Gait Training Minutes  15  -NATALIE         Total Minutes    Timed Charges Total Minutes 15  -NATALIE       Total Minutes 15  -NATALIE                User Key  (r) = Recorded By, (t) = Taken By, (c) = Cosigned By      Initials Name Provider Type    Toney Ferguson PTA Physical Therapist Assistant                  Therapy Charges for Today       Code Description Service Date Service Provider Modifiers Qty    74819825693 HC GAIT TRAINING EA 15 MIN 9/2/2024 Toney Marie PTA GP 1            PT G-Codes  Outcome Measure Options: AM-PAC 6 Clicks Basic Mobility (PT)  AM-PAC 6 Clicks Score (PT): 20  AM-PAC 6 Clicks Score (OT): 21    Toney Marie PTA  9/2/2024

## 2024-09-02 NOTE — PLAN OF CARE
Goal Outcome Evaluation:           Progress: improving     Up in chair most of shift.  IVF started as ordered.  Ortho BPs qshift.  Todays results in chart and showed decrease.  OVN sleep ox ordered for tonight.  Voiding.  PVR 26cc.  Plans likely for rehab at discharge.  RA.  Eliquis.  SCDs.  Safety maintained.

## 2024-09-02 NOTE — PROGRESS NOTES
Patient Name: Rubin Martell  Date of Admission: 8/31/2024  Today's Date: 09/02/24  Length of Stay: 2  Primary Care Physician: Toby Saavedra MD    Subjective   Chief Complaint: Syncope felt to be secondary to vasovagal spots    HPI   Rubin Martell is an 89-year-old male with a past medical history of arthritis, atrial fibrillation, dementia, enlarged prostate, reflux, loss of hearing, hypertension, hyperlipidemia, hypothyroidism, neuropathy, pacemaker, renal disorder and sleep apnea.  Patient presented to [facility] ED with an episode of syncope while using the bathroom.  EMS was called.  Initial blood pressure was 70 palpated.  CT scans revealed possible pneumonia (treated 3 weeks ago).  He received Zosyn.  Constipation also noted on CT.  It is felt episode was vasovagal.  Single set of orthostatic blood pressures reveals 24 drop in systolic pressure from 136/52 to 112/45 standing.    Today: Patient sitting up in chair.  He states he feels good.  He has had a BM today without difficulty or dizziness.  During assessment I noted bilateral lower extremity ankle and pedal edema, patient states this is normal and is better than usual.  He is concerned regarding ability to care for wife who has dementia.  He states he is just not physically able at this time.  He states due to his neuropathy in his legs and feet which he feels is worsening, he is not able to pick his feet up to ambulate safely.  Therefore, he is agreeable to rehab placement for 1 to 2 weeks.  He states he has to get strong enough to care for her at home.  They have a disabled son who is not able to assist in her care.  Labs reviewed, creatinine 1.96, slightly worse compared to yesterday of 1.57, GFR 32, hemoglobin 9.1, hematocrit 29.4, anemia studies reveal iron 34, ferritin 54, iron saturation 12, transferrin 191, TIBC 285, reticulocyte 1.75%..  Patient continues to work with physical therapy.    Documented weights    08/31/24 1001 09/01/24 0329 09/02/24  0544   Weight: 109 kg (240 lb) 109 kg (240 lb 14.4 oz) 106 kg (234 lb 6.4 oz)        Results for orders placed during the hospital encounter of 08/31/24    Adult Transthoracic Echo Complete W/ Cont if Necessary Per Protocol    Interpretation Summary    Left ventricular systolic function is normal. Left ventricular ejection fraction appears to be 61 - 65%.    Left ventricular wall thickness is consistent with mild concentric hypertrophy.    Left ventricular diastolic function is consistent with (grade II w/high LAP) pseudonormalization.    The left atrial cavity is mild to moderately dilated.    Normal right ventricular cavity size and systolic function noted.    Mild aortic valve stenosis is present.    Estimated right ventricular systolic pressure from tricuspid regurgitation is normal (<35 mmHg).       Review of Systems   All pertinent negatives and positives are as above. All other systems have been reviewed and are negative unless otherwise stated.     Objective    Temp:  [97.2 °F (36.2 °C)-97.9 °F (36.6 °C)] 97.7 °F (36.5 °C)  Heart Rate:  [65-73] 68  Resp:  [16-18] 16  BP: (112-136)/(45-56) 123/56  Physical Exam  Vitals reviewed.   Constitutional:       Appearance: He is obese. He is ill-appearing.   HENT:      Head: Normocephalic and atraumatic.      Mouth/Throat:      Mouth: Mucous membranes are moist.      Pharynx: Oropharynx is clear.   Eyes:      Extraocular Movements: Extraocular movements intact.      Conjunctiva/sclera: Conjunctivae normal.   Cardiovascular:      Rate and Rhythm: Normal rate and regular rhythm.      Comments: Atrial paced 62  Pulmonary:      Effort: Pulmonary effort is normal.      Breath sounds: Normal breath sounds.   Abdominal:      General: Abdomen is protuberant.      Palpations: Abdomen is soft.   Musculoskeletal:      Cervical back: Normal range of motion and neck supple.      Right lower leg: Edema present.      Left lower leg: Edema present.      Comments: Generalized weakness  and debility   Skin:     General: Skin is warm and dry.   Neurological:      General: No focal deficit present.      Mental Status: He is alert and oriented to person, place, and time.   Psychiatric:         Mood and Affect: Mood normal.         Behavior: Behavior normal.       Results Review:  I have reviewed the labs, radiology results, and diagnostic studies.    Laboratory Data:   Results from last 7 days   Lab Units 09/02/24 0445 09/01/24  0531 08/31/24  1032   WBC 10*3/mm3 5.16 4.51 7.67   HEMOGLOBIN g/dL 9.1* 9.5* 10.3*   HEMATOCRIT % 29.4* 30.3* 32.1*   PLATELETS 10*3/mm3 207 219 240        Results from last 7 days   Lab Units 09/02/24 0446 09/01/24  0531 08/31/24  1032   SODIUM mmol/L 140 141 140   POTASSIUM mmol/L 4.9 4.5 4.6   CHLORIDE mmol/L 102 104 103   CO2 mmol/L 29.0 30.0* 28.0   BUN mg/dL 27* 26* 30*   CREATININE mg/dL 1.96* 1.57* 1.76*   CALCIUM mg/dL 8.4* 8.4* 8.6   BILIRUBIN mg/dL  --  0.3 0.5   ALK PHOS U/L  --  74 80   ALT (SGPT) U/L  --  20 23   AST (SGOT) U/L  --  31 41*   GLUCOSE mg/dL 102* 101* 119*       Culture Data:     Microbiology Results (last 10 days)       Procedure Component Value - Date/Time    S. Pneumo Ag Urine or CSF - Urine, Urine, Clean Catch [[id number]]  (Normal) Collected: 09/01/24 0259    Lab Status: Final result Specimen: Urine, Clean Catch Updated: 09/01/24 0314     Strep Pneumo Ag Negative    Legionella Antigen, Urine - Urine, Urine, Clean Catch [[id number]]  (Normal) Collected: 09/01/24 0259    Lab Status: Final result Specimen: Urine, Clean Catch Updated: 09/01/24 0314     LEGIONELLA ANTIGEN, URINE Negative    Narrative:      .    MRSA Screen, PCR (Inpatient) - Swab, Nares [[id number]]  (Normal) Collected: 08/31/24 2122    Lab Status: Final result Specimen: Swab from Nares Updated: 08/31/24 2242     MRSA PCR No MRSA Detected    Narrative:      The negative predictive value of this diagnostic test is high and should only be used to consider de-escalating anti-MRSA  therapy. A positive result may indicate colonization with MRSA and must be correlated clinically.    Respiratory Panel PCR w/COVID-19(SARS-CoV-2) ROSS/GUILLAUME/TESS/PAD/COR/RENETTA In-House, NP Swab in UTM/VTM, 2 HR TAT - Swab, Nasopharynx [[id number]]  (Normal) Collected: 08/31/24 2122    Lab Status: Final result Specimen: Swab from Nasopharynx Updated: 08/31/24 2222     ADENOVIRUS, PCR Not Detected     Coronavirus 229E Not Detected     Coronavirus HKU1 Not Detected     Coronavirus NL63 Not Detected     Coronavirus OC43 Not Detected     COVID19 Not Detected     Human Metapneumovirus Not Detected     Human Rhinovirus/Enterovirus Not Detected     Influenza A PCR Not Detected     Influenza B PCR Not Detected     Parainfluenza Virus 1 Not Detected     Parainfluenza Virus 2 Not Detected     Parainfluenza Virus 3 Not Detected     Parainfluenza Virus 4 Not Detected     RSV, PCR Not Detected     Bordetella pertussis pcr Not Detected     Bordetella parapertussis PCR Not Detected     Chlamydophila pneumoniae PCR Not Detected     Mycoplasma pneumo by PCR Not Detected    Narrative:      In the setting of a positive respiratory panel with a viral infection PLUS a negative procalcitonin without other underlying concern for bacterial infection, consider observing off antibiotics or discontinuation of antibiotics and continue supportive care. If the respiratory panel is positive for atypical bacterial infection (Bordetella pertussis, Chlamydophila pneumoniae, or Mycoplasma pneumoniae), consider antibiotic de-escalation to target atypical bacterial infection.    Blood Culture With DESIRE - Blood, Arm, Left [[id number]]  (Normal) Collected: 08/31/24 1713    Lab Status: Preliminary result Specimen: Blood from Arm, Left Updated: 09/01/24 1831     Blood Culture No growth at 24 hours    Blood Culture With DESIRE - Blood, Arm, Right [[id number]]  (Normal) Collected: 08/31/24 1712    Lab Status: Preliminary result Specimen: Blood from Arm, Right Updated:  09/01/24 1831     Blood Culture No growth at 24 hours             Radiology Data:   Imaging Results (Last 7 Days)       Procedure Component Value Units Date/Time    US Carotid Bilateral [[id number]] Resulted: 09/01/24 1209     Updated: 09/01/24 1232    CT Chest Without Contrast Diagnostic [[id number]] Collected: 08/31/24 1328     Updated: 08/31/24 1335    Narrative:      EXAM: CT CHEST WO CONTRAST DIAGNOSTIC-      DATE: 8/31/2024 11:45 AM     HISTORY: ? PNA seen on CTAP ? PNA       COMPARISON: CT abdomen and pelvis 8/31/2024.     DOSE LENGTH PRODUCT: 370.51 mGy.cm mGy cm. Automatic exposure control  was utilized to make radiation dose as low as reasonably achievable.     TECHNIQUE: Unenhanced CT images of the chest obtained with multiplanar  reformats.     FINDINGS:     MEDIASTINUM/EXTRATHORACIC: There is calcification of the thoracic aorta  which is torturous. There is coronary artery calcification and  calcification at the aortic valve. No pericardial or pleural effusion is  identified. Borderline mediastinal lymph nodes may be reactive. Lymph  node at the right hilum measures 1.2 cm in short axis.     LUNGS/AIRWAYS: There is scarring at the lung apices. There are patchy  bilateral areas of groundglass opacity right greater than left likely  related to pneumonitis. The airways are unremarkable. There is  atelectasis versus scarring at the lung bases.     INCLUDED UPPER ABDOMEN: There is cholelithiasis. Moderate stool is noted  in the colon.     SOFT TISSUES: There is mild gynecomastia.     BONES: No suspicious osseous lesion identified.       Impression:      1. Patchy bilateral areas of groundglass opacity right greater than left  likely infectious in etiology and borderline mediastinal and right hilar  lymph nodes are likely reactive.     This report was signed and finalized on 8/31/2024 1:32 PM by Bro Schwartz.       CT Abdomen Pelvis Without Contrast [[id number]] Collected: 08/31/24 1107     Updated:  08/31/24 1114    Narrative:      EXAM: CT ABDOMEN PELVIS WO CONTRAST-      DATE: 8/31/2024 9:49 AM     HISTORY: Constipation       COMPARISON: None available.     DOSE LENGTH PRODUCT: 1557.56 mGy.cm Automatic exposure control was  utilized to make radiation dose as low as reasonably achievable.     TECHNIQUE: Noncontract axial images of the abdomen and pelvis obtained  with multiplanar reformats.     FINDINGS:  VISUALIZED CHEST: There is coronary artery calcification and  calcification at the aortic valve. No pericardial or pleural effusion is  identified. There is breathing motion artifact. There are patchy  groundglass opacities at both lung bases may represent pneumonia.     LIVER/BILE DUCTS: Unenhanced liver is unremarkable. There is  cholelithiasis without inflammatory change. Bile ducts are unremarkable.     KIDNEYS/URETERS: There is renal atrophy. No renal or ureteral calculi or  hydronephrosis is seen.     ADRENAL: Unremarkable.        SPLEEN: Unremarkable.     PANCREAS: Fatty infiltrated but otherwise unremarkable.     MESENTERY: No mesenteric or retroperitoneal lymphadenopathy or free  fluid is seen.     VASCULATURE: There is calcified atherosclerosis of the abdominal aorta  without aneurysm.     GI TRACT: There is moderate stool in the colon. No bowel obstruction or  inflammatory changes are seen.     PELVIS: There are small inguinal hernias containing fat. Urinary bladder  is unremarkable. No pelvic lymphadenopathy or free fluid is seen.     SOFT TISSUES: Small umbilical hernia contains fat.     BONES: No acute or aggressive bony lesion.           Impression:      1. Moderate constipation.  2. Cholelithiasis without definite inflammatory change.  3. Small inguinal hernias containing fat.  4. Patchy nodular groundglass opacities at the lung bases may represent  pneumonia.        This report was signed and finalized on 8/31/2024 11:11 AM by Bro Schwartz.       CT Head Without Contrast [[id number]]  Collected: 08/31/24 1104     Updated: 08/31/24 1108    Narrative:      EXAM: CT HEAD WO CONTRAST-      DATE: 8/31/2024 9:49 AM     HISTORY: Syncope and collapse       COMPARISON: None available.     DOSE LENGTH PRODUCT: 1557.56 mGy.cm  Automated exposure control was also  utilized to decrease patient radiation dose.     TECHNIQUE: Unenhanced CT images obtained from vertex to skull base with  multiplanar reformats.     FINDINGS:  There is no acute intracranial hemorrhage, midline shift, mass effect,  or hydrocephalus. There is no CT evidence for acute infarct.     There are chronic changes with volume loss and chronic small vessel  ischemic change of the periventricular white matter.      SOFT TISSUES: The scalp soft tissues are unremarkable.        SINUS: The visualized paranasal sinuses are clear. There is  opacification of the right mastoid air cells.     ORBITS: The visualized orbits and globes are unremarkable. There is  bilateral lens extraction.          Impression:      1. Chronic changes and no acute intracranial findings.   2. Opacification of the right mastoid air cells.     This report was signed and finalized on 8/31/2024 11:05 AM by Bro Schwartz.                I have reviewed the patient's current medications.     allopurinol, 100 mg, Oral, Daily  amiodarone, 200 mg, Oral, Q24H  apixaban, 2.5 mg, Oral, Q12H  atorvastatin, 20 mg, Oral, Daily  budesonide, 0.5 mg, Nebulization, BID - RT  famotidine, 20 mg, Oral, BID AC  gabapentin, 300 mg, Oral, Nightly  levothyroxine, 125 mcg, Oral, Daily  memantine, 10 mg, Oral, Daily  montelukast, 10 mg, Oral, Nightly  senna-docusate sodium, 2 tablet, Oral, BID  sodium chloride, 10 mL, Intravenous, Q12H  tamsulosin, 0.4 mg, Oral, Daily         Intake/Output Summary (Last 24 hours) at 9/2/2024 1533  Last data filed at 9/2/2024 1422  Gross per 24 hour   Intake 480 ml   Output 1575 ml   Net -1095 ml        Assessment/Plan   Assessment  Active Hospital Problems     Diagnosis     **Vasovagal syncope     Gait instability     Anemia of chronic disease     Slow transit constipation     [place] (hard of hearing)     Neuropathy     Pacemaker     Stage 3b chronic kidney disease     Sleep apnea        Treatment Plan  1.  Gait instability, consult  for placement, patient prefers [facility] as it is near his home, continue PT/OT  2.  Vasovagal syncope, will continue orthostatic blood pressures and consider midodrine if deemed necessary, did discuss with RN Anel to assure these are completed, ultrasound of carotids pending  3.  Stage IIIb chronic kidney disease,, start gentle hydration normal saline 50 mL/hour, BMP in a.m.  4.  Anemia of chronic disease based on anemia studies, no further treatment  5.  Neuropathy, continue PT/OT  6.  Sleep apnea, overnight oximetry, supplemental oxygen as needed, incentive spirometry  7.  Hard of hearing, unsure communication is understood  8.  Pacemaker, continuous atrial pacing currently 82 without ectopy  9.  Constipation, ongoing bowel regimen, BM today    Medical Decision Making  Gait instability, acute, high, unchanged  Vasovagal syncope, acute, moderate, improving  Anemia of chronic disease, chronic, moderate, unchanged  Hard of hearing, chronic, moderate, unchanged  Neuropathy, chronic, high, worsening  Stage IIIb chronic kidney disease, chronic, high, worsening  Sleep apnea, chronic, moderate, unchanged  Constipation, acute, moderate, unchanged    Number and Complexity of problems: 8  Differential Diagnosis: None    Conditions and Status        Condition is unchanged.     [facility] Data  External documents reviewed: No  Cardiac tracing (EKG, telemetry) interpretation: Atrial pacing at 82  Radiology interpretation: Ultrasound carotids pending, chest x-ray and CT of the chest revealed groundglass opacities with concerns for pneumonia however patient did have pneumonia 2 weeks ago and was treated appropriately, WBC 5.16, no bandemia or left  shift, no cough-no physical symptoms noted of pneumonia on assessment  Labs reviewed: reatinine 1.96, slightly worse compared to yesterday of 1.57, GFR 32, hemoglobin 9.1, hematocrit 29.4, anemia studies reveal iron 34, ferritin 54, iron saturation 12, transferrin 191, TIBC 285, reticulocyte 1.75%.  Any tests that were considered but not ordered: No     Decision rules/scores evaluated (example [id number], [place], etc): No     Discussed with: Patient and Dr. Godwin     Care Planning  Shared decision making: Patient and Dr. Godwin  Code status and discussions: Full  Surrogate Decision Maker wife    Disposition  Social Determinants of Health that impact treatment or disposition: Unable to care for self and his wife at home  I expect the patient to be discharged to rehab in 1-2 days.     Electronically signed by MAZIN Miranda, 09/02/24, 15:33 CDT.

## 2024-09-02 NOTE — PROGRESS NOTES
Assessment tool to be used for patients with existing breathing treatments ordered by hospitalist                               Respiratory Therapist Driven Protocol - RT to Assess and Treat Algorithm    Item 0 Points 1 Point 2 Points 3 Points 4 Points Subtotal   Mental Status Alert, orientated, cooperative Lethargic, follows commands Confused, not following commands Obtunded or Somnolent Comatose 0   Respiratory Pattern Regular RR  8-16 breaths/minute Increased RR  18-25 breaths/minute Dyspnea on exertion, irregular RR  26-30/minute Shortness of breath,  RR 31-35 breaths/minute Accessory muscle use, severe SOB  RR > 35 breath/minute 0   Breath Sounds Clear Decreased unilaterally Decreased bilaterally Basilar crackles Wheezing and/or rhonchi 1   Cough Strong, spontaneous non-productive Strong productive Weak, non-productive Weak productive or weak with rhonchi Absent or may require suctioning 0   Pulmonary Status Nonsmoker or no previous history > 1 year quit < 1 PPD  < 1 year quit >  or = 1 PPD Diagnosed pulmonary disease (severe or chronic) Severe or chronic pulmonary disease with exacerbation 1   Surgical Status None General surgery (non-abdominal or non-thoracic) Lower abdominal Thoracic or upper abdominal Thoracic with pulmonary disease 0   Chest X-ray Clear Chronic changes Infiltrates, atelectasis or pleural effusion Infiltrates > 1 lobe Diffuse infiltrates and atelectasis and/or effusions    Activity level Ambulatory Ambulatory with assistance Non-ambulatory Paraplegic Quadriplegic 1                     Total Score 3   Score    Drug Therapy Frequency  20 or >    Q4 Duoneb & Q3 Albuterol PRN 15 - 19     Q6 Duoneb & Q4 Albuterol PRN 10 - 14    QID Duoneb & Q4 Albuterol PRN 5 - 9    TID Duoneb & Q6 Albuterol PRN 0 - 4    Q4 PRN Duoneb or Q4 PRN Albuterol    Incentive Spirometry - Initial RT instruct    Lung Expansion Therapy (PEP) Bronchopulmonary Hygiene (CPT)   Q4 & PRN - Severe atelectasis,  poor oxygenation Q4 - copious secretions, dyspnea, unable to sleep, mucus plugging   QID - High risk for persistent atelectasis, existence of atelectasis QID & Q4 PRN - Moderate secretion production   TID - At risk for developing atelectasis TID - small amounts of secretions with poor cough   BID - prevention of atelectasis BID - unable to breathe deeply and cough spontaneously   *RT Protocol patients will be re-assessed/re-evaluated every 48 hours.    *Patients who are home nebulizer treatments will be protocoled to no less than their home regimen and will remain     on their home regimen with re-evaluations as needed with changes in patient condition.    RT Comments/Recommendations: PRN treatments

## 2024-09-02 NOTE — PLAN OF CARE
Goal Outcome Evaluation:  Plan of Care Reviewed With: patient        Progress: improving          Pt with no complaints of pain so far during shift. IV IID. Up with assist and cane. Voiding. BM this shift. SCDs and eliquis for VTE prevention. BLE edema. Tele on. VSS. Safety maintained. Bed alarm on.

## 2024-09-03 LAB
ANION GAP SERPL CALCULATED.3IONS-SCNC: 7 MMOL/L (ref 5–15)
BUN SERPL-MCNC: 26 MG/DL (ref 8–23)
BUN/CREAT SERPL: 14.1 (ref 7–25)
CALCIUM SPEC-SCNC: 8.5 MG/DL (ref 8.6–10.5)
CHLORIDE SERPL-SCNC: 103 MMOL/L (ref 98–107)
CO2 SERPL-SCNC: 28 MMOL/L (ref 22–29)
CREAT SERPL-MCNC: 1.84 MG/DL (ref 0.76–1.27)
DEPRECATED RDW RBC AUTO: 54.6 FL (ref 37–54)
EGFRCR SERPLBLD CKD-EPI 2021: 34.8 ML/MIN/1.73
ERYTHROCYTE [DISTWIDTH] IN BLOOD BY AUTOMATED COUNT: 15.9 % (ref 12.3–15.4)
GLUCOSE SERPL-MCNC: 96 MG/DL (ref 65–99)
HCT VFR BLD AUTO: 30.5 % (ref 37.5–51)
HGB BLD-MCNC: 9.4 G/DL (ref 13–17.7)
MCH RBC QN AUTO: 29.1 PG (ref 26.6–33)
MCHC RBC AUTO-ENTMCNC: 30.8 G/DL (ref 31.5–35.7)
MCV RBC AUTO: 94.4 FL (ref 79–97)
PLATELET # BLD AUTO: 222 10*3/MM3 (ref 140–450)
PMV BLD AUTO: 10.8 FL (ref 6–12)
POTASSIUM SERPL-SCNC: 4.6 MMOL/L (ref 3.5–5.2)
RBC # BLD AUTO: 3.23 10*6/MM3 (ref 4.14–5.8)
SODIUM SERPL-SCNC: 138 MMOL/L (ref 136–145)
WBC NRBC COR # BLD AUTO: 5.1 10*3/MM3 (ref 3.4–10.8)

## 2024-09-03 PROCEDURE — 94762 N-INVAS EAR/PLS OXIMTRY CONT: CPT

## 2024-09-03 PROCEDURE — 92526 ORAL FUNCTION THERAPY: CPT

## 2024-09-03 PROCEDURE — 94664 DEMO&/EVAL PT USE INHALER: CPT

## 2024-09-03 PROCEDURE — 94799 UNLISTED PULMONARY SVC/PX: CPT

## 2024-09-03 PROCEDURE — 97535 SELF CARE MNGMENT TRAINING: CPT

## 2024-09-03 PROCEDURE — 85027 COMPLETE CBC AUTOMATED: CPT | Performed by: NURSE PRACTITIONER

## 2024-09-03 PROCEDURE — 97110 THERAPEUTIC EXERCISES: CPT

## 2024-09-03 PROCEDURE — 80048 BASIC METABOLIC PNL TOTAL CA: CPT | Performed by: NURSE PRACTITIONER

## 2024-09-03 PROCEDURE — 94761 N-INVAS EAR/PLS OXIMETRY MLT: CPT

## 2024-09-03 RX ADMIN — ATORVASTATIN CALCIUM 20 MG: 10 TABLET, FILM COATED ORAL at 09:30

## 2024-09-03 RX ADMIN — LEVOTHYROXINE SODIUM 125 MCG: 125 TABLET ORAL at 09:30

## 2024-09-03 RX ADMIN — AMIODARONE HYDROCHLORIDE 200 MG: 200 TABLET ORAL at 09:30

## 2024-09-03 RX ADMIN — Medication 10 ML: at 09:31

## 2024-09-03 RX ADMIN — APIXABAN 2.5 MG: 5 TABLET, FILM COATED ORAL at 09:30

## 2024-09-03 RX ADMIN — TAMSULOSIN HYDROCHLORIDE 0.4 MG: 0.4 CAPSULE ORAL at 09:29

## 2024-09-03 RX ADMIN — APIXABAN 2.5 MG: 5 TABLET, FILM COATED ORAL at 21:21

## 2024-09-03 RX ADMIN — BUDESONIDE 0.5 MG: 0.5 INHALANT RESPIRATORY (INHALATION) at 07:35

## 2024-09-03 RX ADMIN — ALLOPURINOL 100 MG: 100 TABLET ORAL at 09:30

## 2024-09-03 RX ADMIN — BUDESONIDE 0.5 MG: 0.5 INHALANT RESPIRATORY (INHALATION) at 19:40

## 2024-09-03 RX ADMIN — MEMANTINE HYDROCHLORIDE 10 MG: 5 TABLET, FILM COATED ORAL at 09:29

## 2024-09-03 RX ADMIN — DOCUSATE SODIUM 50 MG AND SENNOSIDES 8.6 MG 2 TABLET: 8.6; 5 TABLET, FILM COATED ORAL at 21:21

## 2024-09-03 RX ADMIN — Medication 10 ML: at 21:21

## 2024-09-03 RX ADMIN — FAMOTIDINE 20 MG: 20 TABLET, FILM COATED ORAL at 21:21

## 2024-09-03 NOTE — THERAPY TREATMENT NOTE
Acute Care - Physical Therapy Treatment Note  [facility]     Patient Name: Rubin Martell  : 1935  MRN: [id number]  Today's Date: 9/3/2024   Onset of Illness/Injury or Date of Surgery: 24  Visit Dx:     ICD-10-CM ICD-9-CM   1. Syncope, unspecified syncope type  R55 780.2   2. Pneumonia of both lower lobes due to infectious organism  J18.9 486   3. Pharyngeal dysphagia  R13.13 787.23   4. Decreased functional mobility and endurance [Z74.09]  Z74.09 780.99   5. Gait instability [R26.81]  R26.81 781.2     Patient Active Problem List   Diagnosis    Atrial fib/flutter, transient    Dementia    Arthritis    GERD (gastroesophageal reflux disease)    Enlarged prostate    H/O total knee replacement    [place] (hard of hearing)    Hypertension    Hypothyroidism    Neuropathy    Pacemaker    Stage 3b chronic kidney disease    Sleep apnea    HCAP (healthcare-associated pneumonia)    Vasovagal syncope    Gait instability    Anemia of chronic disease    Slow transit constipation     Past Medical History:   Diagnosis Date    Arthritis     Atrial fib/flutter, transient     Dementia     Enlarged prostate     GERD (gastroesophageal reflux disease)     H/O total knee replacement     History of colonoscopy     [place] (hard of hearing)     Hypertension     Hypothyroidism     Hypothyroidism     Neuropathy     Pacemaker     Renal disorder     Sleep apnea      History reviewed. No pertinent surgical history.  PT Assessment (Last 12 Hours)       PT Evaluation and Treatment       Row Name 24 1505          Physical Therapy Time and Intention    Subjective Information no complaints  -KJ     Document Type therapy note (daily note)  -KJ     Mode of Treatment physical therapy  -KJ     Patient Effort good  -KJ       Row Name 24 1505          General Information    Existing Precautions/Restrictions fall  -KJ       Row Name 24 1505          Pain    Pretreatment Pain Rating 3/10  -KJ     Posttreatment Pain Rating 3/10  -KJ      Pain Location - Side/Orientation Bilateral  -KJ     Pain Location - back;foot  -KJ     Pre/Posttreatment Pain Comment neuropathy and bad back  -KJ       Row Name 09/03/24 1505          Bed Mobility    Comment, (Bed Mobility) up in chair  -KJ       Row Name 09/03/24 1505          Motor Skills    Therapeutic Exercise aerobic  -KJ       Row Name 09/03/24 1505          Aerobic Exercise    Comment, Aerobic Exercise (Therapeutic Exercise) AROM BLE's x 20 reps  -KJ       Row Name 09/03/24 1505          Positioning and Restraints    Pre-Treatment Position sitting in chair/recliner  -KJ     Post Treatment Position chair  -KJ               User Key  (r) = Recorded By, (t) = Taken By, (c) = Cosigned By      Initials Name Provider Type    Sagrario Forman, PTA Physical Therapist Assistant                    Physical Therapy Education       Title: PT OT SLP Therapies (In Progress)       Topic: Physical Therapy (In Progress)       Point: Mobility training (Done)       Learning Progress Summary             Patient Acceptance, E, VU by SB at 9/1/2024 1523    Comment: pt edu on POC, benefits of act and d/c plans                         Point: Home exercise program (Not Started)       Learner Progress:  Not documented in this visit.              Point: Body mechanics (Not Started)       Learner Progress:  Not documented in this visit.              Point: Precautions (Done)       Learning Progress Summary             Patient Acceptance, E, VU by SB at 9/1/2024 1523    Comment: pt edu on POC, benefits of act and d/c plans                                         User Key       Initials Effective Dates Name Provider Type Discipline     07/11/23 -  Maribell Anaya, PT DPT Physical Therapist PT                  PT Recommendation and Plan         Outcome Measures       Row Name 09/02/24 1145 09/01/24 0905          How much help from another person do you currently need...    Turning from your back to your side while in flat bed without  using bedrails? 4  -NATALIE --     Moving from lying on back to sitting on the side of a flat bed without bedrails? 4  -NATALIE --     Moving to and from a bed to a chair (including a wheelchair)? 3  -NATALIE --     Standing up from a chair using your arms (e.g., wheelchair, bedside chair)? 3  -NATALIE --     Climbing 3-5 steps with a railing? 3  -NATALIE --     To walk in hospital room? 3  -NATALIE --     AM-PAC 6 Clicks Score (PT) 20  -NATALIE --     Highest Level of Mobility Goal 6 --> Walk 10 steps or more  -NATALIE --        How much help from another is currently needed...    Putting on and taking off regular lower body clothing? -- 3  -AC     Bathing (including washing, rinsing, and drying) -- 3  -AC     Toileting (which includes using toilet bed pan or urinal) -- 4  -AC     Putting on and taking off regular upper body clothing -- 4  -AC     Taking care of personal grooming (such as brushing teeth) -- 3  -AC     Eating meals -- 4  -AC     AM-PAC 6 Clicks Score (OT) -- 21  -AC        Functional Assessment    Outcome Measure Options AM-PAC 6 Clicks Basic Mobility (PT)  -NATALIE AM-PAC 6 Clicks Daily Activity (OT)  -AC               User Key  (r) = Recorded By, (t) = Taken By, (c) = Cosigned By      Initials Name Provider Type    AC Damir Marley, OTR/L, CNT Occupational Therapist    Toney Ferguson PTA Physical Therapist Assistant                     Time Calculation:    PT Charges       Row Name 09/03/24 1519             Time Calculation    Start Time 1500  -KJ      Stop Time 1523  -KJ      Time Calculation (min) 23 min  -KJ      PT Received On 09/03/24  -KJ      PT Goal Re-Cert Due Date 09/11/24  -KJ         Time Calculation- PT    Total Timed Code Minutes- PT 23 minute(s)  -KJ                User Key  (r) = Recorded By, (t) = Taken By, (c) = Cosigned By      Initials Name Provider Type    Sagrario Forman PTA Physical Therapist Assistant                  Therapy Charges for Today       Code Description Service Date Service Provider  Modifiers Qty    21624443310 HC PT THER PROC EA 15 MIN 9/3/2024 Sagrario Walsh, PTA GP 2            PT G-Codes  Outcome Measure Options: AM-PAC 6 Clicks Daily Activity (OT)  AM-PAC 6 Clicks Score (PT): 20  AM-PAC 6 Clicks Score (OT): 22    Sagrario Walsh PTA  9/3/2024

## 2024-09-03 NOTE — THERAPY TREATMENT NOTE
Patient Name: Rubin Martell  : 1935    MRN: [id number]                              Today's Date: 9/3/2024       Admit Date: 2024    Visit Dx:     ICD-10-CM ICD-9-CM   1. Syncope, unspecified syncope type  R55 780.2   2. Pneumonia of both lower lobes due to infectious organism  J18.9 486   3. Pharyngeal dysphagia  R13.13 787.23   4. Decreased functional mobility and endurance [Z74.09]  Z74.09 780.99     Patient Active Problem List   Diagnosis    Atrial fib/flutter, transient    Dementia    Arthritis    GERD (gastroesophageal reflux disease)    Enlarged prostate    H/O total knee replacement    [place] (hard of hearing)    Hypertension    Hypothyroidism    Neuropathy    Pacemaker    Stage 3b chronic kidney disease    Sleep apnea    HCAP (healthcare-associated pneumonia)    Vasovagal syncope    Gait instability    Anemia of chronic disease    Slow transit constipation     Past Medical History:   Diagnosis Date    Arthritis     Atrial fib/flutter, transient     Dementia     Enlarged prostate     GERD (gastroesophageal reflux disease)     H/O total knee replacement     History of colonoscopy     [place] (hard of hearing)     Hypertension     Hypothyroidism     Hypothyroidism     Neuropathy     Pacemaker     Renal disorder     Sleep apnea      History reviewed. No pertinent surgical history.   General Information       Row Name 24 1320          OT Time and Intention    Document Type therapy note (daily note)  -     Mode of Treatment occupational therapy  -       Row Name 24 1320          General Information    Existing Precautions/Restrictions fall  -       Row Name 24 1320          Cognition    Orientation Status (Cognition) oriented x 4  -       Row Name 24 1320          Safety Issues, Functional Mobility    Impairments Affecting Function (Mobility) balance;pain;range of motion (ROM);strength;endurance/activity tolerance  -               User Key  (r) = Recorded By, (t) = Taken By,  (c) = Cosigned By      Initials Name Provider Type    Kierra Shanks, OTR/L Occupational Therapist                     Mobility/ADL's       Row Name 09/03/24 1320          Transfers    Transfers stand-sit transfer;sit-stand transfer;toilet transfer  -       Row Name 09/03/24 1320          Sit-Stand Transfer    Sit-Stand [place] (Transfers) standby assist  -LS     Assistive Device (Sit-Stand Transfers) cane, quad tip  -LS       Row Name 09/03/24 1320          Stand-Sit Transfer    Stand-Sit [place] (Transfers) standby assist  -LS     Assistive Device (Stand-Sit Transfers) cane, quad  -LS       Row Name 09/03/24 1320          Toilet Transfer    Type (Toilet Transfer) sit-stand;stand-sit  -LS     [place] Level (Toilet Transfer) standby assist  -LS       Row Name 09/03/24 1320          Functional Mobility    Functional Mobility- Ind. Level contact guard assist  -LS     Functional Mobility- Device cane, quad  -LS       Row Name 09/03/24 1320          Activities of Daily Living    BADL Assessment/Intervention toileting  -       Row Name 09/03/24 1320          Toileting Assessment/Training    [place] Level (Toileting) toileting skills;supervision  -     Position (Toileting) unsupported sitting;unsupported standing  -LS               User Key  (r) = Recorded By, (t) = Taken By, (c) = Cosigned By      Initials Name Provider Type    Kierra Shanks, OTR/L Occupational Therapist                   Obj/Interventions    No documentation.                  Goals/Plan    No documentation.                  Clinical Impression       Row Name 09/03/24 1320          Pain Assessment    Pretreatment Pain Rating 4/10  -LS     Posttreatment Pain Rating 4/10  -LS     Pain Location - Side/Orientation Bilateral  -LS     Pain Location - foot;back  -LS     Pain Intervention(s) Repositioned;Ambulation/increased activity  -       Row Name 09/03/24 1320          Plan of Care Review    Plan of Care Reviewed With  patient  -LS     Progress improving  -LS     Outcome Evaluation OT tx completed. Pt standing in BR upon therapist arrival; A&Ox4; c/o 4/10 pain in B feet and back. Pt performed all sit<>stand transfers with SBA. Pt ambulated in hallway utilizing quad cane with CGA. Pt performed all toileting tasks with supervision. Pt completed 3 sets, 5 reps of chair push-ups/tricep dips; Pt tolerated well. Cont OT POC. Recommend short term rehab placement at discharge.  -       Row Name 09/03/24 1320          Therapy Plan Review/Discharge Plan (OT)    Anticipated Discharge Disposition (OT) skilled nursing facility;sub acute care setting  -       Row Name 09/03/24 1320          Positioning and Restraints    Pre-Treatment Position standing in room  -LS     Post Treatment Position chair  -LS     In Chair call light within reach;encouraged to call for assist;sitting  -LS               User Key  (r) = Recorded By, (t) = Taken By, (c) = Cosigned By      Initials Name Provider Type    LS Kierra Willis, OTR/L Occupational Therapist                   Outcome Measures       Row Name 09/03/24 1320          How much help from another is currently needed...    Putting on and taking off regular lower body clothing? 3  -LS     Bathing (including washing, rinsing, and drying) 3  -LS     Toileting (which includes using toilet bed pan or urinal) 4  -LS     Putting on and taking off regular upper body clothing 4  -LS     Taking care of personal grooming (such as brushing teeth) 4  -LS     Eating meals 4  -LS     AM-PAC 6 Clicks Score (OT) 22  -       Row Name 09/03/24 0730          How much help from another person do you currently need...    Turning from your back to your side while in flat bed without using bedrails? 4  -DF     Moving from lying on back to sitting on the side of a flat bed without bedrails? 4  -DF     Moving to and from a bed to a chair (including a wheelchair)? 3  -DF     Standing up from a chair using your arms (e.g.,  wheelchair, bedside chair)? 3  -DF     Climbing 3-5 steps with a railing? 3  -DF     To walk in hospital room? 3  -DF     AM-PAC 6 Clicks Score (PT) 20  -DF     Highest Level of Mobility Goal 6 --> Walk 10 steps or more  -DF       Row Name 09/03/24 1320          Functional Assessment    Outcome Measure Options AM-PAC 6 Clicks Daily Activity (OT)  -               User Key  (r) = Recorded By, (t) = Taken By, (c) = Cosigned By      Initials Name Provider Type     Fauzia Dee RN Registered Nurse    Kierra Shanks OTR/L Occupational Therapist                    Occupational Therapy Education       Title: PT OT SLP Therapies (In Progress)       Topic: Occupational Therapy (Done)       Point: ADL training (Done)       Description:   Instruct learner(s) on proper safety adaptation and remediation techniques during self care or transfers.   Instruct in proper use of assistive devices.                  Learning Progress Summary             Patient Acceptance, E, VU by  at 9/1/2024 1022                         Point: Home exercise program (Done)       Description:   Instruct learner(s) on appropriate technique for monitoring, assisting and/or progressing therapeutic exercises/activities.                  Learning Progress Summary             Patient Acceptance, E, VU by  at 9/1/2024 1022                         Point: Body mechanics (Done)       Description:   Instruct learner(s) on proper positioning and spine alignment during self-care, functional mobility activities and/or exercises.                  Learning Progress Summary             Patient Acceptance, E, VU by  at 9/1/2024 1022                                         User Key       Initials Effective Dates Name Provider Type [facility] 02/03/23 -  Damir Marley, OTR/L, CNT Occupational Therapist OT                  OT Recommendation and Plan     Plan of Care Review  Plan of Care Reviewed With: patient  Progress: improving  Outcome  Evaluation: OT tx completed. Pt standing in BR upon therapist arrival; A&Ox4; c/o 4/10 pain in B feet and back. Pt performed all sit<>stand transfers with SBA. Pt ambulated in hallway utilizing quad cane with CGA. Pt performed all toileting tasks with supervision. Pt completed 3 sets, 5 reps of chair push-ups/tricep dips; Pt tolerated well. Cont OT POC. Recommend short term rehab placement at discharge.     Time Calculation:         Time Calculation- OT       Row Name 09/03/24 1320             Time Calculation- OT    OT Start Time 1320  -LS      OT Stop Time 1343  -LS      OT Time Calculation (min) 23 min  -LS      Total Timed Code Minutes- OT 23 minute(s)  -LS      OT Received On 09/03/24  -                User Key  (r) = Recorded By, (t) = Taken By, (c) = Cosigned By      Initials Name Provider Type    LS Kierra Willis, OTR/L Occupational Therapist                  Therapy Charges for Today       Code Description Service Date Service Provider Modifiers Qty    76962164027 HC OT SELF CARE/MGMT/TRAIN EA 15 MIN 9/3/2024 Kierra Willis, OTR/L GO 1    90719219785 HC OT THER PROC EA 15 MIN 9/3/2024 Kierra Willis, OTR/L GO 1                 Kierra Willis OTR/L  9/3/2024

## 2024-09-03 NOTE — PLAN OF CARE
Goal Outcome Evaluation:  Plan of Care Reviewed With: patient        Progress: improving  Outcome Evaluation: OT tx completed. Pt standing in BR upon therapist arrival; A&Ox4; c/o 4/10 pain in B feet and back. Pt performed all sit<>stand transfers with SBA. Pt ambulated in hallway utilizing quad cane with CGA. Pt performed all toileting tasks with supervision. Pt completed 3 sets, 5 reps of chair push-ups/tricep dips; Pt tolerated well. Cont OT POC. Recommend short term rehab placement at discharge.      Anticipated Discharge Disposition (OT): skilled nursing facility, sub acute care setting

## 2024-09-03 NOTE — THERAPY DISCHARGE NOTE
Acute Care - Speech Language Pathology   Swallow Treatment Note/Discharge [facility]     Patient Name: Rubin Martell  : 1935  MRN: [id number]  Today's Date: 9/3/2024  Onset of Illness/Injury or Date of Surgery: 24     Referring Physician: Dr. Millard      Admit Date: 2024  Pt was sitting up in the chair and had just finished lunch upon SLP arrival. He took sips of thin water via cup rim with no overt s/s of aspiration. He reports no difficulty with regular solid foods. He states the only food item he ever has difficulty with is cornbread. His lung sounds have been documented as diminished only. He was able to state some aspiration precautions such as taking small bites and sips. SLP again educated on using a chin tuck as needed with liquids. Recommend continuing regular solids and thin liquids. SLP will sign off.   Babita Leone, CCC-SLP 9/3/2024 13:03 CDT    Visit Dx:    ICD-10-CM ICD-9-CM   1. Syncope, unspecified syncope type  R55 780.2   2. Pneumonia of both lower lobes due to infectious organism  J18.9 486   3. Pharyngeal dysphagia  R13.13 787.23   4. Decreased functional mobility and endurance [Z74.09]  Z74.09 780.99     Patient Active Problem List   Diagnosis    Atrial fib/flutter, transient    Dementia    Arthritis    GERD (gastroesophageal reflux disease)    Enlarged prostate    H/O total knee replacement    [place] (hard of hearing)    Hypertension    Hypothyroidism    Neuropathy    Pacemaker    Stage 3b chronic kidney disease    Sleep apnea    HCAP (healthcare-associated pneumonia)    Vasovagal syncope    Gait instability    Anemia of chronic disease    Slow transit constipation     Past Medical History:   Diagnosis Date    Arthritis     Atrial fib/flutter, transient     Dementia     Enlarged prostate     GERD (gastroesophageal reflux disease)     H/O total knee replacement     History of colonoscopy     [place] (hard of hearing)     Hypertension     Hypothyroidism     Hypothyroidism      Neuropathy     Pacemaker     Renal disorder     Sleep apnea      History reviewed. No pertinent surgical history.    SLP Recommendation and Plan                                   Daily Summary of Progress (SLP): progress toward functional goals as expected (09/03/24 1245)                       Treatment Assessment (SLP): continued (09/03/24 1245)  Treatment Assessment Comments (SLP): Discharge (09/03/24 1245)  Plan for Continued Treatment (SLP): treatment no longer indicated as all goals met (09/03/24 1245)    Plan of Care Reviewed With: patient (09/03/24 1300)  Progress: no change (09/03/24 1300)  Outcome Evaluation: See note (09/03/24 1300)    SWALLOW EVALUATION (Last 72 Hours)       SLP Adult Swallow Evaluation       Row Name 09/03/24 1245 09/01/24 0755                Rehab Evaluation    Document Type discharge treatment  -MB evaluation  -MD       Subjective Information no complaints  -MB no complaints  -MD       Patient Observations alert;cooperative  -MB alert;cooperative;agree to therapy  -MD       Patient/Family/Caregiver Comments/Observations No family present  -MB No family present.  -MD       Patient Effort good  -MB good  -MD       Symptoms Noted During/After Treatment -- none  -MD          General Information    Patient Profile Reviewed -- yes  -MD       Pertinent History Of Current Problem -- Patient is admitted after a syncopal episode. Patient has HCAP, a fib, dementia, and GERD. Medical history includes arthritis, enlarged prostate, total knee replacement, [place], HTN, HLD, hypothyroidism, neuropathy, pacemaker, renal disorder, and sleep apnea.  -MD       Current Method of Nutrition -- regular textures;thin liquids  -MD       Precautions/Limitations, Vision -- WFL;for purposes of eval  -MD       Precautions/Limitations, Hearing -- hearing impairment, bilaterally  -MD       Prior Level of Function-Communication -- unknown  -MD       Prior Level of Function-Swallowing -- no diet consistency restrictions   -MD       Plans/Goals Discussed with -- patient  -MD       Barriers to Rehab -- none identified  -MD       Patient's Goals for Discharge -- return home  -MD          Pain    Additional Documentation Pain Scale: FACES Pre/Post-Treatment (Group)  -MB Pain Scale: Numbers Pre/Post-Treatment (Group)  -MD          Pain Scale: Numbers Pre/Post-Treatment    Pretreatment Pain Rating -- 0/10 - no pain  -MD       Posttreatment Pain Rating -- 0/10 - no pain  -MD          Pain Scale: FACES Pre/Post-Treatment    Pain: FACES Scale, Pretreatment 0-->no hurt  -MB --          Oral Motor Structure and Function    Dentition Assessment -- natural, present and adequate  -MD       Secretion Management -- WNL/AZULL  -MD       Mucosal Quality -- dry  -MD          Oral Musculature and Cranial Nerve Assessment    Oral Motor General Assessment -- WFL  -MD          General Eating/Swallowing Observations    Respiratory Support Currently in Use -- room air  -MD       Eating/Swallowing Skills -- self-fed  -MD       Positioning During Eating -- upright in bed  -MD       Utensils Used -- spoon;straw  -MD       Consistencies Trialed -- regular textures;thin liquids  -MD          Respiratory    Respiratory Status -- WFL  -MD          Clinical Swallow Eval    Oral Prep Phase -- CHARISMA  -MD       Oral Transit -- CHARISMA  -MD       Oral Residue -- WFL  -MD       Pharyngeal Phase -- suspected pharyngeal impairment  -MD       Esophageal Phase -- unremarkable  -MD       Clinical Swallow Evaluation Summary -- see note  -MD          Pharyngeal Phase Concerns    Pharyngeal Phase Concerns -- multiple swallows  -MD       Multiple Swallows -- thin  -MD          SLP Evaluation Clinical Impression    SLP Swallowing Diagnosis -- suspect acute-on-chronic;mild;pharyngeal dysphagia  -MD       Functional Impact -- risk of aspiration/pneumonia  -MD       Rehab Potential/Prognosis, Swallowing -- good, to achieve stated therapy goals  -MD       Swallow Criteria for Skilled  Therapeutic Interventions Met -- demonstrates skilled criteria  -MD          SLP Treatment Clinical Impressions    Treatment Assessment (SLP) continued  -MB --       Treatment Assessment Comments (SLP) Discharge  -MB --       Daily Summary of Progress (SLP) progress toward functional goals as expected  -MB --       Plan for Continued Treatment (SLP) treatment no longer indicated as all goals met  -MB --          Recommendations    Therapy Frequency (Swallow) -- PRN  -MD       Predicted Duration Therapy Intervention (Days) -- until discharge  -MD       SLP Diet Recommendation -- regular textures;thin liquids  -MD       Recommended Diagnostics -- reassess via clinical swallow evaluation  -MD       Recommended Precautions and Strategies -- upright posture during/after eating;small bites of food and sips of liquid;alternate between small bites of food and sips of liquid;hard swallow with each bite or sip;chin tuck;general aspiration precautions;reflux precautions  -MD       Oral Care Recommendations -- Oral Care before breakfast, after meals and PRN  -MD       SLP Rec. for Method of Medication Administration -- meds whole;with puree;with thin liquids  -MD       Monitor for Signs of Aspiration -- yes;notify SLP if any concerns  -MD       Anticipated Discharge Disposition (SLP) -- unknown  -MD          Swallow Goals (SLP)    Swallow LTGs -- Patient will demonstrate functional swallow for  -MD       Swallow STGs -- diet tolerance goal selection (SLP)  -MD       Diet Tolerance Goal Selection (SLP) -- Patient will tolerate trials of  -MD          (LTG) Patient will demonstrate functional swallow for    Diet Texture (Demonstrate functional swallow) regular textures  -MB regular textures  -MD       Liquid viscosity (Demonstrate functional swallow) thin liquids  -MB thin liquids  -MD       [place] (Demonstrate functional swallow) independently (over 90% accuracy)  -MB independently (over 90% accuracy)  -MD       Time Frame  (Demonstrate functional swallow) by discharge  -MB by discharge  -MD       Barriers (Demonstrate functional swallow) n/a  -MB n/a  -MD       Progress/Outcomes (Demonstrate functional swallow) goal met  -MB new goal  -MD          (STG) Patient will tolerate trials of    Consistencies Trialed (Tolerate trials) regular textures;thin liquids  -MB regular textures;thin liquids  -MD       Desired Outcome (Tolerate trials) without signs/symptoms of aspiration  -MB without signs/symptoms of aspiration  -MD       [place] (Tolerate trials) independently (over 90% accuracy)  -MB independently (over 90% accuracy)  -MD       Time Frame (Tolerate trials) by discharge  -MB by discharge  -MD       Progress/Outcomes (Tolerate trials) goal met  -MB new goal  -MD                 User Key  (r) = Recorded By, (t) = Taken By, (c) = Cosigned By      Initials Name Effective Dates    Babita May, CCC-SLP 02/03/23 -     Monika Watters, SLP 06/21/22 -                     EDUCATION  The patient has been educated in the following areas:   Dysphagia (Swallowing Impairment).         SLP GOALS       Row Name 09/03/24 1245 09/01/24 0745          (LTG) Patient will demonstrate functional swallow for    Diet Texture (Demonstrate functional swallow) regular textures  -MB regular textures  -MD     Liquid viscosity (Demonstrate functional swallow) thin liquids  -MB thin liquids  -MD     [place] (Demonstrate functional swallow) independently (over 90% accuracy)  -MB independently (over 90% accuracy)  -MD     Time Frame (Demonstrate functional swallow) by discharge  -MB by discharge  -MD     Barriers (Demonstrate functional swallow) n/a  -MB n/a  -MD     Progress/Outcomes (Demonstrate functional swallow) goal met  -MB new goal  -MD        (STG) Patient will tolerate trials of    Consistencies Trialed (Tolerate trials) regular textures;thin liquids  -MB regular textures;thin liquids  -MD     Desired Outcome (Tolerate trials) without  signs/symptoms of aspiration  -MB without signs/symptoms of aspiration  -MD     [place] (Tolerate trials) independently (over 90% accuracy)  -MB independently (over 90% accuracy)  -MD     Time Frame (Tolerate trials) by discharge  -MB by discharge  -MD     Progress/Outcomes (Tolerate trials) goal met  -MB new goal  -MD               User Key  (r) = Recorded By, (t) = Taken By, (c) = Cosigned By      Initials Name Provider Type    Babita May CCC-SLP Speech and Language Pathologist    Monika Watters, SLP Speech and Language Pathologist                           Time Calculation:    Time Calculation- SLP       Row Name 09/03/24 1302             Time Calculation- SLP    SLP Start Time 1245  -MB      SLP Stop Time 1302  -MB      SLP Time Calculation (min) 17 min  -MB      SLP Received On 09/03/24  -MB         Untimed Charges    74448-ND Treatment Swallow Minutes 17  -MB         Total Minutes    Untimed Charges Total Minutes 17  -MB       Total Minutes 17  -MB                User Key  (r) = Recorded By, (t) = Taken By, (c) = Cosigned By      Initials Name Provider Type    Babita May CCC-SLP Speech and Language Pathologist                    Therapy Charges for Today       Code Description Service Date Service Provider Modifiers Qty    [id number] HC ST TREATMENT SWALLOW 1 9/3/2024 Babita Leone CCC-SLP  1                      Babita GALLOWAY. EULALIO Leone  9/3/2024

## 2024-09-03 NOTE — PLAN OF CARE
Goal Outcome Evaluation:  Plan of Care Reviewed With: patient  Progress: improving         Pt with no c/o pain thus far this shift. IVF infusing per order. Up with SBA and cane. Voiding per BRP. Post void residual performed; noted to be 16 ml's. BM this shift. VSS. Safety maintained.

## 2024-09-03 NOTE — CASE MANAGEMENT/SOCIAL WORK
Continued Stay Note   [facility]     Patient Name: Rubin Martell  MRN: [id number]  Today's Date: 9/3/2024    Admit Date: 8/31/2024    Plan: Referral to [facility]   Discharge Plan       Row Name 09/03/24 1031       Plan    Plan Referral to [facility]    Patient/Family in Agreement with Plan yes    Plan Comments Therapy has evaluated pt. Pt is wanting a referral to [facility]. Referral sent.                   Discharge Codes    No documentation.                       LENNY Palmer

## 2024-09-03 NOTE — PLAN OF CARE
Goal Outcome Evaluation:  Plan of Care Reviewed With: patient        Progress: no change  Outcome Evaluation: See note                    Treatment Assessment (SLP): continued (09/03/24 1245)  Treatment Assessment Comments (SLP): Discharge (09/03/24 1245)  Plan for Continued Treatment (SLP): treatment no longer indicated as all goals met (09/03/24 1245)

## 2024-09-03 NOTE — PROGRESS NOTES
[facility] Medicine Services  INPATIENT PROGRESS NOTE    Patient Name: Rubin Martell  Date of Admission: 8/31/2024  Today's Date: 09/03/24  Length of Stay: 3  Primary Care Physician: Toby Saavedra MD    Subjective   Chief Complaint: f/u weakness    HPI   Patient seen resting in bed.  He states he feels little better than yesterday.  Just generally fatigued and weak still.  Denies chest pain or shortness of breath.  No cough or phlegm.  No fevers.  No new issues or events.        Review of Systems   All pertinent negatives and positives are as above. All other systems have been reviewed and are negative unless otherwise stated.     Objective    Temp:  [97.5 °F (36.4 °C)-98.3 °F (36.8 °C)] 97.9 °F (36.6 °C)  Heart Rate:  [64-68] 66  Resp:  [16-18] 16  BP: (123-162)/(45-60) 162/60  Physical Exam  GEN: Awake, alert, interactive, in NAD  HEENT: PERRLA, EOMI, Anicteric, Trachea midline  Lungs: no wheezing/rales/rhonchi  Heart: RRR, +S1/s2, no rub  ABD: soft, nt/nd, +BS, no guarding/rebound  Extremities: atraumatic, no cyanosis, trace LE edema b/l  Skin: no rashes or lesions  Neuro: no focal deficits, gait not tested  Psych: normal mood & affect        Results Review:  I have reviewed the labs, radiology results, and diagnostic studies.    Laboratory Data:   Results from last 7 days   Lab Units 09/03/24 0517 09/02/24 0445 09/01/24  0531   WBC 10*3/mm3 5.10 5.16 4.51   HEMOGLOBIN g/dL 9.4* 9.1* 9.5*   HEMATOCRIT % 30.5* 29.4* 30.3*   PLATELETS 10*3/mm3 222 207 219        Results from last 7 days   Lab Units 09/03/24 0517 09/02/24 0446 09/01/24  0531 08/31/24  1032   SODIUM mmol/L 138 140 141 140   POTASSIUM mmol/L 4.6 4.9 4.5 4.6   CHLORIDE mmol/L 103 102 104 103   CO2 mmol/L 28.0 29.0 30.0* 28.0   BUN mg/dL 26* 27* 26* 30*   CREATININE mg/dL 1.84* 1.96* 1.57* 1.76*   CALCIUM mg/dL 8.5* 8.4* 8.4* 8.6   BILIRUBIN mg/dL  --   --  0.3 0.5   ALK PHOS U/L  --   --  74 80   ALT (SGPT) U/L  --    --  20 23   AST (SGOT) U/L  --   --  31 41*   GLUCOSE mg/dL 96 102* 101* 119*       Culture Data:   Blood Culture   Date Value Ref Range Status   08/31/2024 No growth at 2 days  Preliminary   08/31/2024 No growth at 2 days  Preliminary       Radiology Data:   Imaging Results (Last 24 Hours)       ** No results found for the last 24 hours. **            I have reviewed the patient's current medications.     Assessment/Plan   Assessment  Active Hospital Problems    Diagnosis     **Vasovagal syncope     Gait instability     Anemia of chronic disease     Slow transit constipation     [place] (hard of hearing)     Neuropathy     Pacemaker     Stage 3b chronic kidney disease     Sleep apnea        Treatment Plan  #1 vasovagal syncope -in the setting of straining to move his bowels.  Patient did have some mild orthostasis after admission but again suspect this was more vasovagal given timing.  No significant arrhythmias noted on telemetry here.    #2 abnormal chest CT -concern for infiltrates.  Patient was recently treated and completed course of care for pneumonia.  He is on room air.  No white count or fever.  No cough or phlegm.  Negative Pro-Maurilio.  No further antibiotics at this time.  Monitor off and recommend follow-up imaging for resolution as an outpatient through PCP in several weeks.    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  Follow-up with vascular surgery for monitoring as outpatient.  Continue statin and Eliquis.    #4 CKD 3B -appears roughly around baseline based on historical data.  Monitor.    #5 history of A-fib -chronically anticoagulated with Eliquis and is on amiodarone.    Medical Decision Making  Number and Complexity of problems: 1 acute, multiple chronic  Differential Diagnosis: as above    Conditions and Status        Stable, at goal, awaiting placement.      [facility] Data  External documents reviewed: none  Cardiac tracing (EKG, telemetry) interpretation: none  Radiology interpretation:  none  Labs reviewed: as above  Any tests that were considered but not ordered: none     Decision rules/scores evaluated (example [id number], [place], etc): none     Discussed with: patient, nursing staff,      Care Planning  Shared decision making: Patient apprised of current labs, vitals, imaging and treatment plan.  They are agreeable with proceeding with plans as discussed.    Code status and discussions: full code    Disposition  Social Determinants of Health that impact treatment or disposition: none  I expect the patient to be discharged to [facility] when bed available..     Electronically signed by Ranulfo Godwin DO, 09/03/24, 10:47 CDT.

## 2024-09-03 NOTE — PLAN OF CARE
Goal Outcome Evaluation:  Plan of Care Reviewed With: patient        Progress: no change  Outcome Evaluation: Pt denies pain so far this shift; has been up in chair since I arrived this AM; voiding; up ad nancy with cane to BR; IID now; d/c planning ongoing; safety maintained.

## 2024-09-04 ENCOUNTER — READMISSION MANAGEMENT (OUTPATIENT)
Dept: CALL CENTER | Facility: HOSPITAL | Age: 89
End: 2024-09-04
Payer: MEDICARE

## 2024-09-04 VITALS
WEIGHT: 239.1 LBS | RESPIRATION RATE: 18 BRPM | HEIGHT: 69 IN | HEART RATE: 65 BPM | OXYGEN SATURATION: 95 % | BODY MASS INDEX: 35.41 KG/M2 | DIASTOLIC BLOOD PRESSURE: 63 MMHG | SYSTOLIC BLOOD PRESSURE: 141 MMHG | TEMPERATURE: 98.2 F

## 2024-09-04 PROCEDURE — 97530 THERAPEUTIC ACTIVITIES: CPT

## 2024-09-04 PROCEDURE — 97535 SELF CARE MNGMENT TRAINING: CPT | Performed by: OCCUPATIONAL THERAPIST

## 2024-09-04 PROCEDURE — 94664 DEMO&/EVAL PT USE INHALER: CPT

## 2024-09-04 PROCEDURE — 94799 UNLISTED PULMONARY SVC/PX: CPT

## 2024-09-04 RX ADMIN — TAMSULOSIN HYDROCHLORIDE 0.4 MG: 0.4 CAPSULE ORAL at 08:16

## 2024-09-04 RX ADMIN — ATORVASTATIN CALCIUM 20 MG: 10 TABLET, FILM COATED ORAL at 08:16

## 2024-09-04 RX ADMIN — MEMANTINE HYDROCHLORIDE 10 MG: 5 TABLET, FILM COATED ORAL at 08:16

## 2024-09-04 RX ADMIN — APIXABAN 2.5 MG: 5 TABLET, FILM COATED ORAL at 08:16

## 2024-09-04 RX ADMIN — ALLOPURINOL 100 MG: 100 TABLET ORAL at 08:16

## 2024-09-04 RX ADMIN — LEVOTHYROXINE SODIUM 125 MCG: 125 TABLET ORAL at 08:16

## 2024-09-04 RX ADMIN — AMIODARONE HYDROCHLORIDE 200 MG: 200 TABLET ORAL at 08:16

## 2024-09-04 RX ADMIN — BUDESONIDE 0.5 MG: 0.5 INHALANT RESPIRATORY (INHALATION) at 07:49

## 2024-09-04 RX ADMIN — Medication 10 ML: at 08:17

## 2024-09-04 NOTE — CASE MANAGEMENT/SOCIAL WORK
Continued Stay Note   [facility]     Patient Name: Rubin Martell  MRN: [id number]  Today's Date: 9/4/2024    Admit Date: 8/31/2024    Plan: Home   Discharge Plan       Row Name 09/04/24 1404       Plan    Plan Home    Patient/Family in Agreement with Plan yes    Final Discharge Disposition Code 06 - home with home health care    Final Note [facility] can accept pt but they do not have a bed available. Spoke with Mabel [phone] from there and she has made contact with pt and spouse. Pt wants to go home and Reynaldo Buitrago will follow up with him when they have a bed open. Faxing d/c summary to Shai HUFFMAN at [phone].                   Discharge Codes    No documentation.                 Expected Discharge Date and Time       Expected Discharge Date Expected Discharge Time    Sep 4, 2024               LENNY Palmer

## 2024-09-04 NOTE — THERAPY TREATMENT NOTE
Acute Care - Physical Therapy Treatment Note  [facility]     Patient Name: Rubin Martell  : 1935  MRN: [id number]  Today's Date: 2024   Onset of Illness/Injury or Date of Surgery: 24  Visit Dx:     ICD-10-CM ICD-9-CM   1. Syncope, unspecified syncope type  R55 780.2   2. Pneumonia of both lower lobes due to infectious organism  J18.9 486   3. Pharyngeal dysphagia  R13.13 787.23   4. Decreased functional mobility and endurance [Z74.09]  Z74.09 780.99   5. Gait instability [R26.81]  R26.81 781.2     Patient Active Problem List   Diagnosis    Atrial fib/flutter, transient    Dementia    Arthritis    GERD (gastroesophageal reflux disease)    Enlarged prostate    H/O total knee replacement    [place] (hard of hearing)    Hypertension    Hypothyroidism    Neuropathy    Pacemaker    Stage 3b chronic kidney disease    Sleep apnea    HCAP (healthcare-associated pneumonia)    Vasovagal syncope    Gait instability    Anemia of chronic disease    Slow transit constipation     Past Medical History:   Diagnosis Date    Arthritis     Atrial fib/flutter, transient     Dementia     Enlarged prostate     GERD (gastroesophageal reflux disease)     H/O total knee replacement     History of colonoscopy     [place] (hard of hearing)     Hypertension     Hypothyroidism     Hypothyroidism     Neuropathy     Pacemaker     Renal disorder     Sleep apnea      History reviewed. No pertinent surgical history.  PT Assessment (Last 12 Hours)       PT Evaluation and Treatment       Row Name 24 1429          Physical Therapy Time and Intention    Subjective Information no complaints  -KJ     Document Type therapy note (daily note)  -KJ     Mode of Treatment physical therapy  -KJ     Patient Effort good  -KJ       Row Name 24 1429          General Information    Existing Precautions/Restrictions fall  -KJ       Row Name 24 1429          Pain    Pretreatment Pain Rating 3/10  -KJ     Posttreatment Pain Rating 3/10  -KJ      Pain Location - Side/Orientation Bilateral  -KJ     Pain Location lower  -KJ     Pain Location - back  -KJ       Row Name 09/04/24 1429          Bed Mobility    Comment, (Bed Mobility) up in chair  -KJ       Row Name 09/04/24 1429          Sit-Stand Transfer    Sit-Stand Benson (Transfers) verbal cues;contact guard  -KJ     Assistive Device (Sit-Stand Transfers) cane, quad  -KJ       Row Name 09/04/24 1429          Stand-Sit Transfer    Stand-Sit Benson (Transfers) supervision  -KJ     Assistive Device (Stand-Sit Transfers) cane, quad  -KJ       Row Name 09/04/24 1429          Gait/Stairs (Locomotion)    Benson Level (Gait) verbal cues;contact guard  -KJ     Assistive Device (Gait) cane, quad  -KJ     Distance in Feet (Gait) --  stood several times at chair; assisted pt in dressing. Amb to bathroom  -KJ     Deviations/Abnormal Patterns (Gait) gait speed decreased;stride length decreased  -KJ     Bilateral Gait Deviations forward flexed posture  -KJ       Row Name 09/04/24 1429          Positioning and Restraints    Pre-Treatment Position sitting in chair/recliner  -KJ     Post Treatment Position chair  -KJ               User Key  (r) = Recorded By, (t) = Taken By, (c) = Cosigned By      Initials Name Provider Type    Sagrario Forman, PTA Physical Therapist Assistant                    Physical Therapy Education       Title: PT OT SLP Therapies (In Progress)       Topic: Physical Therapy (In Progress)       Point: Mobility training (Done)       Learning Progress Summary             Patient Acceptance, E, VU by SB at 9/1/2024 1523    Comment: pt edu on POC, benefits of act and d/c plans                         Point: Home exercise program (Not Started)       Learner Progress:  Not documented in this visit.              Point: Body mechanics (Not Started)       Learner Progress:  Not documented in this visit.              Point: Precautions (Done)       Learning Progress Summary             Patient  Acceptance, E, VU by SB at 9/1/2024 1523    Comment: pt edu on POC, benefits of act and d/c plans                                         User Key       Initials Effective Dates Name Provider Type Discipline     07/11/23 -  Maribell Anaya, PT DPT Physical Therapist PT                  PT Recommendation and Plan         Outcome Measures       Row Name 09/02/24 1145             How much help from another person do you currently need...    Turning from your back to your side while in flat bed without using bedrails? 4  -NATALIE      Moving from lying on back to sitting on the side of a flat bed without bedrails? 4  -NATALIE      Moving to and from a bed to a chair (including a wheelchair)? 3  -NATALIE      Standing up from a chair using your arms (e.g., wheelchair, bedside chair)? 3  -NATALIE      Climbing 3-5 steps with a railing? 3  -NATALIE      To walk in hospital room? 3  -NATALIE      AM-PAC 6 Clicks Score (PT) 20  -NATALIE      Highest Level of Mobility Goal 6 --> Walk 10 steps or more  -NATALIE         Functional Assessment    Outcome Measure Options AM-PAC 6 Clicks Basic Mobility (PT)  -NATALIE                User Key  (r) = Recorded By, (t) = Taken By, (c) = Cosigned By      Initials Name Provider Type    Toney Ferguson PTA Physical Therapist Assistant                     Time Calculation:    PT Charges       Row Name 09/04/24 1450             Time Calculation    Start Time 1429  -KJ      Stop Time 1452  -KJ      Time Calculation (min) 23 min  -KJ      PT Received On 09/04/24  -KJ      PT Goal Re-Cert Due Date 09/11/24  -KJ         Time Calculation- PT    Total Timed Code Minutes- PT 23 minute(s)  -KJ                User Key  (r) = Recorded By, (t) = Taken By, (c) = Cosigned By      Initials Name Provider Type    Sagrario Forman PTA Physical Therapist Assistant                  Therapy Charges for Today       Code Description Service Date Service Provider Modifiers Qty    19014553300 HC PT THER PROC EA 15 MIN 9/3/2024 Sagrario Walsh PTA  GP 2    [id number]  PT THERAPEUTIC ACT EA 15 MIN 9/4/2024 Sagrario Walsh, PTA GP 2            PT G-Codes  Outcome Measure Options: AM-PAC 6 Clicks Daily Activity (OT)  AM-PAC 6 Clicks Score (PT): 20  AM-PAC 6 Clicks Score (OT): 22    Sagrario Walsh PTA  9/4/2024

## 2024-09-04 NOTE — PLAN OF CARE
Problem: Adult Inpatient Plan of Care  Goal: Plan of Care Review  Recent Flowsheet Documentation  Taken 9/4/2024 1123 by Romeo Cowan, OTR/L  Progress: no change  Plan of Care Reviewed With: patient  Outcome Evaluation: OT tx completed. Pt is alert and agreeable to therapy. Pt reports no pain. Pt reports being anxious about hearing about discharge as he is eager to leave. Pt was CGA-min A for sit to stand t/f. Pt was CGA for functional mobility chair to BR to chair with quad cane. Pt was supervision to CGA for all grooming tasks completed in supported standing at sink. Pt sat at toilet to don deoderant, but does not void. Pt was mod A to don hospital gown over back side. Continue OT POC.

## 2024-09-04 NOTE — DISCHARGE SUMMARY
[facility] Medicine Services  DISCHARGE SUMMARY       Date of Admission: 8/31/2024  Date of Discharge:  9/4/2024  Primary Care Physician: Toby Saavedra MD    Presenting Problem/History of Present Illness:  89-year-old male with history of atrial fibrillation, and Hyperstat hyperplasia, hypertension, hyperlipidemia, hypothyroidism, pacemaker in situ, peripheral neuropathy unknown etiology, obstructive sleep apnea, admitted on 8/31/2024 after an episode of syncope.     Final Discharge Diagnoses:  Active Hospital Problems    Diagnosis     **Vasovagal syncope     Gait instability     Anemia of chronic disease     Slow transit constipation     [place] (hard of hearing)     Neuropathy     Pacemaker     Stage 3b chronic kidney disease     Sleep apnea        Consults: None    Procedures Performed: None    Pertinent Test Results:   Results for orders placed during the hospital encounter of 08/31/24    Adult Transthoracic Echo Complete W/ Cont if Necessary Per Protocol    Interpretation Summary    Left ventricular systolic function is normal. Left ventricular ejection fraction appears to be 61 - 65%.    Left ventricular wall thickness is consistent with mild concentric hypertrophy.    Left ventricular diastolic function is consistent with (grade II w/high LAP) pseudonormalization.    The left atrial cavity is mild to moderately dilated.    Normal right ventricular cavity size and systolic function noted.    Mild aortic valve stenosis is present.    Estimated right ventricular systolic pressure from tricuspid regurgitation is normal (<35 mmHg).      Imaging Results (All)       Procedure Component Value Units Date/Time    US Carotid Bilateral [[id number]] Collected: 09/03/24 1259     Updated: 09/03/24 1303    Narrative:      History: Carotid occlusive disease       Impression:      Impression:  1. There is 50 to 69% stenosis of the right internal carotid artery.  2. There is 50 to 69% stenosis  of the left internal carotid artery.  3. Antegrade flow is demonstrated in bilateral vertebral arteries.     Comments: Bilateral carotid vertebral arterial duplex scan was  performed.     Grayscale imaging shows intimal thickening and calcified elements at the  carotid bifurcation. The right internal carotid artery peak systolic  velocity is 287 cm/sec. The end-diastolic velocity is 57.1 cm/sec. The  right ICA/CCA ratio is approximately 3.4. These findings correlate with  50 to 69% stenosis of the right internal carotid artery.     Grayscale imaging shows intimal thickening and calcified elements at the  carotid bifurcation. The left internal carotid artery peak systolic  velocity is 125.3 cm/sec. The end-diastolic velocity is 5 cm/sec. The  left ICA/CCA ratio is approximately 1.7. These findings correlate with  50 to 69% stenosis of the left internal carotid artery.     Antegrade flow is demonstrated in bilateral vertebral arteries.  There is greater than 50% stenosis in bilateral external carotid  arteries.     This report was signed and finalized on 9/3/2024 1:00 PM by Dr. Gume Ingram MD.       CT Chest Without Contrast Diagnostic [[id number]] Collected: 08/31/24 1328     Updated: 08/31/24 1335    Narrative:      EXAM: CT CHEST WO CONTRAST DIAGNOSTIC-      DATE: 8/31/2024 11:45 AM     HISTORY: ? PNA seen on CTAP ? PNA       COMPARISON: CT abdomen and pelvis 8/31/2024.     DOSE LENGTH PRODUCT: 370.51 mGy.cm mGy cm. Automatic exposure control  was utilized to make radiation dose as low as reasonably achievable.     TECHNIQUE: Unenhanced CT images of the chest obtained with multiplanar  reformats.     FINDINGS:     MEDIASTINUM/EXTRATHORACIC: There is calcification of the thoracic aorta  which is torturous. There is coronary artery calcification and  calcification at the aortic valve. No pericardial or pleural effusion is  identified. Borderline mediastinal lymph nodes may be reactive. Lymph  node at the right  hilum measures 1.2 cm in short axis.     LUNGS/AIRWAYS: There is scarring at the lung apices. There are patchy  bilateral areas of groundglass opacity right greater than left likely  related to pneumonitis. The airways are unremarkable. There is  atelectasis versus scarring at the lung bases.     INCLUDED UPPER ABDOMEN: There is cholelithiasis. Moderate stool is noted  in the colon.     SOFT TISSUES: There is mild gynecomastia.     BONES: No suspicious osseous lesion identified.       Impression:      1. Patchy bilateral areas of groundglass opacity right greater than left  likely infectious in etiology and borderline mediastinal and right hilar  lymph nodes are likely reactive.     This report was signed and finalized on 8/31/2024 1:32 PM by Bro Schwartz.       CT Abdomen Pelvis Without Contrast [[id number]] Collected: 08/31/24 1107     Updated: 08/31/24 1114    Narrative:      EXAM: CT ABDOMEN PELVIS WO CONTRAST-      DATE: 8/31/2024 9:49 AM     HISTORY: Constipation       COMPARISON: None available.     DOSE LENGTH PRODUCT: 1557.56 mGy.cm Automatic exposure control was  utilized to make radiation dose as low as reasonably achievable.     TECHNIQUE: Noncontract axial images of the abdomen and pelvis obtained  with multiplanar reformats.     FINDINGS:  VISUALIZED CHEST: There is coronary artery calcification and  calcification at the aortic valve. No pericardial or pleural effusion is  identified. There is breathing motion artifact. There are patchy  groundglass opacities at both lung bases may represent pneumonia.     LIVER/BILE DUCTS: Unenhanced liver is unremarkable. There is  cholelithiasis without inflammatory change. Bile ducts are unremarkable.     KIDNEYS/URETERS: There is renal atrophy. No renal or ureteral calculi or  hydronephrosis is seen.     ADRENAL: Unremarkable.        SPLEEN: Unremarkable.     PANCREAS: Fatty infiltrated but otherwise unremarkable.     MESENTERY: No mesenteric or  retroperitoneal lymphadenopathy or free  fluid is seen.     VASCULATURE: There is calcified atherosclerosis of the abdominal aorta  without aneurysm.     GI TRACT: There is moderate stool in the colon. No bowel obstruction or  inflammatory changes are seen.     PELVIS: There are small inguinal hernias containing fat. Urinary bladder  is unremarkable. No pelvic lymphadenopathy or free fluid is seen.     SOFT TISSUES: Small umbilical hernia contains fat.     BONES: No acute or aggressive bony lesion.           Impression:      1. Moderate constipation.  2. Cholelithiasis without definite inflammatory change.  3. Small inguinal hernias containing fat.  4. Patchy nodular groundglass opacities at the lung bases may represent  pneumonia.        This report was signed and finalized on 8/31/2024 11:11 AM by Bro Schwartz.       CT Head Without Contrast [[id number]] Collected: 08/31/24 1104     Updated: 08/31/24 1108    Narrative:      EXAM: CT HEAD WO CONTRAST-      DATE: 8/31/2024 9:49 AM     HISTORY: Syncope and collapse       COMPARISON: None available.     DOSE LENGTH PRODUCT: 1557.56 mGy.cm  Automated exposure control was also  utilized to decrease patient radiation dose.     TECHNIQUE: Unenhanced CT images obtained from vertex to skull base with  multiplanar reformats.     FINDINGS:  There is no acute intracranial hemorrhage, midline shift, mass effect,  or hydrocephalus. There is no CT evidence for acute infarct.     There are chronic changes with volume loss and chronic small vessel  ischemic change of the periventricular white matter.      SOFT TISSUES: The scalp soft tissues are unremarkable.        SINUS: The visualized paranasal sinuses are clear. There is  opacification of the right mastoid air cells.     ORBITS: The visualized orbits and globes are unremarkable. There is  bilateral lens extraction.          Impression:      1. Chronic changes and no acute intracranial findings.   2. Opacification of the  right mastoid air cells.     This report was signed and finalized on 8/31/2024 11:05 AM by Bro Schwartz.             LAB RESULTS:      Lab 09/03/24  0517 09/02/24  0445 09/01/24  0531 08/31/24  1032   WBC 5.10 5.16 4.51 7.67   HEMOGLOBIN 9.4* 9.1* 9.5* 10.3*   HEMATOCRIT 30.5* 29.4* 30.3* 32.1*   PLATELETS 222 207 219 240   NEUTROS ABS  --   --  3.11 6.54   IMMATURE GRANS (ABS)  --   --  0.01 0.03   LYMPHS ABS  --   --  0.86 0.43*   MONOS ABS  --   --  0.42 0.63   EOS ABS  --   --  0.08 0.02   MCV 94.4 95.5 94.1 92.5   PROCALCITONIN  --   --  0.07  --    LACTATE  --   --   --  1.3         Lab 09/03/24  0517 09/02/24  0446 09/01/24  0531 08/31/24  1032   SODIUM 138 140 141 140   POTASSIUM 4.6 4.9 4.5 4.6   CHLORIDE 103 102 104 103   CO2 28.0 29.0 30.0* 28.0   ANION GAP 7.0 9.0 7.0 9.0   BUN 26* 27* 26* 30*   CREATININE 1.84* 1.96* 1.57* 1.76*   EGFR 34.8* 32.3* 42.1* 36.7*   GLUCOSE 96 102* 101* 119*   CALCIUM 8.5* 8.4* 8.4* 8.6   HEMOGLOBIN A1C  --   --  5.60  --    TSH  --   --  0.700  --          Lab 09/01/24  0531 08/31/24  1032   TOTAL PROTEIN 6.2 7.0   ALBUMIN 3.1* 3.5   GLOBULIN 3.1 3.5   ALT (SGPT) 20 23   AST (SGOT) 31 41*   BILIRUBIN 0.3 0.5   ALK PHOS 74 80         Lab 08/31/24  1254 08/31/24  1032   HSTROP T 36* 35*         Lab 09/01/24  0531   CHOLESTEROL 103   LDL CHOL 54   HDL CHOL 36*   TRIGLYCERIDES 58         Lab 09/02/24  0446   IRON 34*   IRON SATURATION (TSAT) 12*   TIBC 285*   TRANSFERRIN 191*   FERRITIN 54.01         Brief Urine Lab Results  (Last result in the past 365 days)        Color   Clarity   Blood   Leuk Est   Nitrite   Protein   CREAT   Urine HCG        08/31/24 1141 Yellow   Clear   Negative   Negative   Negative   Negative                 Microbiology Results (last 10 days)       Procedure Component Value - Date/Time    S. Pneumo Ag Urine or CSF - Urine, Urine, Clean Catch [[id number]]  (Normal) Collected: 09/01/24 0259    Lab Status: Final result Specimen: Urine, Clean Catch  Updated: 09/01/24 0314     Strep Pneumo Ag Negative    Legionella Antigen, Urine - Urine, Urine, Clean Catch [[id number]]  (Normal) Collected: 09/01/24 0259    Lab Status: Final result Specimen: Urine, Clean Catch Updated: 09/01/24 0314     LEGIONELLA ANTIGEN, URINE Negative    Narrative:      .    MRSA Screen, PCR (Inpatient) - Swab, Nares [[id number]]  (Normal) Collected: 08/31/24 2122    Lab Status: Final result Specimen: Swab from Nares Updated: 08/31/24 2242     MRSA PCR No MRSA Detected    Narrative:      The negative predictive value of this diagnostic test is high and should only be used to consider de-escalating anti-MRSA therapy. A positive result may indicate colonization with MRSA and must be correlated clinically.    Respiratory Panel PCR w/COVID-19(SARS-CoV-2) ROSS/GUILLAUME/TESS/PAD/COR/RENETTA In-House, NP Swab in UTM/VTM, 2 HR TAT - Swab, Nasopharynx [[id number]]  (Normal) Collected: 08/31/24 2122    Lab Status: Final result Specimen: Swab from Nasopharynx Updated: 08/31/24 2222     ADENOVIRUS, PCR Not Detected     Coronavirus 229E Not Detected     Coronavirus HKU1 Not Detected     Coronavirus NL63 Not Detected     Coronavirus OC43 Not Detected     COVID19 Not Detected     Human Metapneumovirus Not Detected     Human Rhinovirus/Enterovirus Not Detected     Influenza A PCR Not Detected     Influenza B PCR Not Detected     Parainfluenza Virus 1 Not Detected     Parainfluenza Virus 2 Not Detected     Parainfluenza Virus 3 Not Detected     Parainfluenza Virus 4 Not Detected     RSV, PCR Not Detected     Bordetella pertussis pcr Not Detected     Bordetella parapertussis PCR Not Detected     Chlamydophila pneumoniae PCR Not Detected     Mycoplasma pneumo by PCR Not Detected    Narrative:      In the setting of a positive respiratory panel with a viral infection PLUS a negative procalcitonin without other underlying concern for bacterial infection, consider observing off antibiotics or discontinuation of antibiotics and  continue supportive care. If the respiratory panel is positive for atypical bacterial infection (Bordetella pertussis, Chlamydophila pneumoniae, or Mycoplasma pneumoniae), consider antibiotic de-escalation to target atypical bacterial infection.    Blood Culture With DESIRE - Blood, Arm, Left [[id number]]  (Normal) Collected: 08/31/24 1713    Lab Status: Preliminary result Specimen: Blood from Arm, Left Updated: 09/03/24 1831     Blood Culture No growth at 3 days    Blood Culture With DESIRE - Blood, Arm, Right [[id number]]  (Normal) Collected: 08/31/24 1712    Lab Status: Preliminary result Specimen: Blood from Arm, Right Updated: 09/03/24 1831     Blood Culture No growth at 3 days            Hospital Course: Patient was admitted to the hospital for medical management.  It was considered her symptoms were secondary to a vasovagal syncope given presentation.  CT scan of the brain without contrast showed no acute abnormalities.  CT scan of the chest showed patchy bilateral areas of groundglass opacities right greater than left, possibly infectious etiology.  Patient received treatment for pneumonia, with Zosyn IV.  Completed antibiotic treatment. I started taking care of this patient 9/4/2024.  On my evaluation he reported no additional symptoms.  No dizziness.  No episodes of syncope.  No chest pain, no palpitations.  Echocardiogram showed preserved ejection fraction diastolic dysfunction grade 2, and mild aortic stenosis.  His renal function improved and approached his baseline.  An ultrasound of the carotid arteries showed bilateral carotid artery disease with 50 to 69% stenosis.  Recommended to have vascular surgery outpatient follow-up.  Recommended, given history of atrial fibrillation, to continue statin and Eliquis. Initially, patient accepted to be discharged to skilled nursing facility. He was accepted at [facility] but they did not have a bed available.  For this reason, patient and family member decided for him  "to be discharged home and wait for bed availability at this institution.          Physical Exam on Discharge:  /63 (BP Location: Right arm, Patient Position: Lying)   Pulse 65   Temp 98.2 °F (36.8 °C) (Oral)   Resp 18   Ht 175.3 cm (69\")   Wt 108 kg (239 lb 1.6 oz)   SpO2 95%   BMI 35.31 kg/m²   Physical Exam  Performed on the same day, see progress note    Condition on Discharge: Stable    Discharge Disposition:  Home or Self Care    Discharge Medications:     Discharge Medications        Continue These Medications        Instructions Start Date   allopurinol 100 MG tablet  Commonly known as: ZYLOPRIM   Take 1 tablet by mouth 2 (Two) Times a Day.      amiodarone 200 MG tablet  Commonly known as: PACERONE   Take 1 tablet by mouth Daily.      atorvastatin 40 MG tablet  Commonly known as: LIPITOR   Take 1 tablet by mouth Daily.      donepezil 5 MG tablet  Commonly known as: ARICEPT   Take 1 tablet by mouth Every Night.      Eliquis 2.5 MG tablet tablet  Generic drug: apixaban   Take 1 tablet by mouth Every 12 (Twelve) Hours.      furosemide 20 MG tablet  Commonly known as: LASIX   Take 1 tablet by mouth 3 (Three) Times a Day.      memantine 10 MG tablet  Commonly known as: NAMENDA   Take 1 tablet by mouth 2 (Two) Times a Day.      Synthroid 125 MCG tablet  Generic drug: levothyroxine   Take 1 tablet by mouth Every Morning. **BRAND NAME**      tamsulosin 0.4 MG capsule 24 hr capsule  Commonly known as: FLOMAX   Take 2 capsules by mouth Every Night.               Discharge Diet:   Diet Instructions       Diet: Cardiac Diets; Healthy Heart (2-3 Na+); Regular (IDDSI 7); Thin (IDDSI 0)      Discharge Diet: Cardiac Diets    Cardiac Diet: Healthy Heart (2-3 Na+)    Texture: Regular (IDDSI 7)    Fluid Consistency: Thin (IDDSI 0)            Activity at Discharge: Fall precautions.  Ambulation with assistive device    Follow-up Appointments:   No future appointments.    Test Results Pending at Discharge: " None    Electronically signed by Aston Mendes MD, 09/04/24, 14:04 CDT.    Time: 40 minutes.

## 2024-09-04 NOTE — THERAPY TREATMENT NOTE
Patient Name: Rubin Martell  : 1935    MRN: [id number]                              Today's Date: 2024       Admit Date: 2024    Visit Dx:     ICD-10-CM ICD-9-CM   1. Syncope, unspecified syncope type  R55 780.2   2. Pneumonia of both lower lobes due to infectious organism  J18.9 486   3. Pharyngeal dysphagia  R13.13 787.23   4. Decreased functional mobility and endurance [Z74.09]  Z74.09 780.99   5. Gait instability [R26.81]  R26.81 781.2     Patient Active Problem List   Diagnosis    Atrial fib/flutter, transient    Dementia    Arthritis    GERD (gastroesophageal reflux disease)    Enlarged prostate    H/O total knee replacement    [place] (hard of hearing)    Hypertension    Hypothyroidism    Neuropathy    Pacemaker    Stage 3b chronic kidney disease    Sleep apnea    HCAP (healthcare-associated pneumonia)    Vasovagal syncope    Gait instability    Anemia of chronic disease    Slow transit constipation     Past Medical History:   Diagnosis Date    Arthritis     Atrial fib/flutter, transient     Dementia     Enlarged prostate     GERD (gastroesophageal reflux disease)     H/O total knee replacement     History of colonoscopy     [place] (hard of hearing)     Hypertension     Hypothyroidism     Hypothyroidism     Neuropathy     Pacemaker     Renal disorder     Sleep apnea      History reviewed. No pertinent surgical history.   General Information       Row Name 24 1123          OT Time and Intention    Document Type therapy note (daily note)  -MM     Mode of Treatment occupational therapy  -MM       Row Name 24 1123          General Information    Patient Profile Reviewed yes  -MM     Existing Precautions/Restrictions fall  -MM     Barriers to Rehab none identified  -MM       Row Name 24 1123          Safety Issues, Functional Mobility    Safety Issues Affecting Function (Mobility) at risk behavior observed;insight into deficits/self-awareness;safety precaution awareness;safety precautions  follow-through/compliance  -     Impairments Affecting Function (Mobility) balance;pain;range of motion (ROM);strength;endurance/activity tolerance  -MM               User Key  (r) = Recorded By, (t) = Taken By, (c) = Cosigned By      Initials Name Provider Type    MM Romeo Cowan, OTR/L Occupational Therapist                     Mobility/ADL's       Row Name 09/04/24 1123          Bed Mobility    Comment, (Bed Mobility) up in chair  -MM       Row Name 09/04/24 1123          Transfers    Transfers stand-sit transfer;sit-stand transfer;toilet transfer  -MM       Row Name 09/04/24 1123          Sit-Stand Transfer    Sit-Stand [place] (Transfers) contact guard;verbal cues  -MM     Assistive Device (Sit-Stand Transfers) cane, quad  -MM       Row Name 09/04/24 1123          Stand-Sit Transfer    Stand-Sit [place] (Transfers) contact guard;verbal cues  -MM     Assistive Device (Stand-Sit Transfers) cane, quad  -MM       Row Name 09/04/24 1123          Toilet Transfer    Type (Toilet Transfer) sit-stand;stand-sit  -MM     [place] Level (Toilet Transfer) contact guard;minimum assist (75% patient effort);verbal cues  -MM     Assistive Device (Toilet Transfer) cane, quad  -MM       Row Name 09/04/24 1123          Functional Mobility    Functional Mobility- Ind. Level contact guard assist;verbal cues required  -MM     Functional Mobility- Device cane, quad  -MM     Functional Mobility- Comment chair to BR to chair.  -MM       Row Name 09/04/24 1123          Activities of Daily Living    BADL Assessment/Intervention grooming;toileting;upper body dressing  -MM       Row Name 09/04/24 1123          Upper Body Dressing Assessment/Training    [place] Level (Upper Body Dressing) moderate assist (50% patient effort);verbal cues;set up  gown over backside  -[facility] Name 09/04/24 1123          Toileting Assessment/Training    Comment, (Toileting) pt utilized toilet as a chair during grooming task but did  not void.  -MM       Row Name 09/04/24 1123          Grooming Assessment/Training    [place] Level (Grooming) hair care, combing/brushing;oral care regimen;wash face, hands;supervision;contact guard assist;verbal cues  don deoderant  -MM     Oral Care teeth brushed - regular toothbrush  -MM     Position (Grooming) sink side;supported standing  -MM               User Key  (r) = Recorded By, (t) = Taken By, (c) = Cosigned By      Initials Name Provider Type    Romeo Carter, OTR/L Occupational Therapist                   Obj/Interventions       Row Name 09/04/24 1123          Balance    Balance Assessment sitting static balance;sitting dynamic balance;standing static balance;standing dynamic balance  -MM     Static Sitting Balance supervision  -MM     Dynamic Sitting Balance supervision  -MM     Position, Sitting Balance supported;sitting in chair  -MM     Static Standing Balance contact guard  -MM     Dynamic Standing Balance contact guard;verbal cues  -MM     Position/Device Used, Standing Balance supported;cane, quad  -MM               User Key  (r) = Recorded By, (t) = Taken By, (c) = Cosigned By      Initials Name Provider Type    Romeo Carter, OTR/L Occupational Therapist                   Goals/Plan    No documentation.                  Clinical Impression       Row Name 09/04/24 1123          Pain Assessment    Pretreatment Pain Rating 0/10 - no pain  -MM     Posttreatment Pain Rating 0/10 - no pain  -MM     Pain Intervention(s) Medication (See MAR);Repositioned;Ambulation/increased activity  -MM       Row Name 09/04/24 1123          Plan of Care Review    Plan of Care Reviewed With patient  -MM     Progress no change  -MM     Outcome Evaluation OT tx completed. Pt is alert and agreeable to therapy. Pt reports no pain. Pt reports being anxious about hearing about discharge as he is eager to leave. Pt was CGA-min A for sit to stand t/f. Pt was CGA for functional mobility chair to BR to chair  with quad cane. Pt was supervision to CGA for all grooming tasks completed in supported standing at sink. Pt sat at toilet to don deoderant, but does not void. Pt was mod A to don hospital gown over back side. Continue OT POC.  -MM       Row Name 09/04/24 1123          Therapy Plan Review/Discharge Plan (OT)    Anticipated Discharge Disposition (OT) skilled nursing facility;sub acute care setting  -MM       Row Name 09/04/24 1123          Vital Signs    O2 Delivery Pre Treatment --  SpO2 remain WFL pre and post tx  -MM       Row Name 09/04/24 1123          Positioning and Restraints    Pre-Treatment Position sitting in chair/recliner  -MM     Post Treatment Position chair  -MM     In Chair sitting;call light within reach;encouraged to call for assist  -MM               User Key  (r) = Recorded By, (t) = Taken By, (c) = Cosigned By      Initials Name Provider Type    MM Romeo Cowan, OTR/L Occupational Therapist                   Outcome Measures       Row Name 09/04/24 1123          How much help from another is currently needed...    Putting on and taking off regular lower body clothing? 2  -MM     Bathing (including washing, rinsing, and drying) 3  -MM     Toileting (which includes using toilet bed pan or urinal) 4  -MM     Putting on and taking off regular upper body clothing 2  -MM     Taking care of personal grooming (such as brushing teeth) 3  -MM     Eating meals 4  -MM     AM-PAC 6 Clicks Score (OT) 18  -MM       Row Name 09/04/24 0816          How much help from another person do you currently need...    Turning from your back to your side while in flat bed without using bedrails? 4  -DF     Moving from lying on back to sitting on the side of a flat bed without bedrails? 4  -DF     Moving to and from a bed to a chair (including a wheelchair)? 3  -DF     Standing up from a chair using your arms (e.g., wheelchair, bedside chair)? 3  -DF     Climbing 3-5 steps with a railing? 3  -DF     To walk in  hospital room? 3  -DF     AM-PAC 6 Clicks Score (PT) 20  -DF     Highest Level of Mobility Goal 6 --> Walk 10 steps or more  -DF       Row Name 09/04/24 1123          Functional Assessment    Outcome Measure Options AM-PAC 6 Clicks Daily Activity (OT)  -MM               User Key  (r) = Recorded By, (t) = Taken By, (c) = Cosigned By      Initials Name Provider Type    Fauzia Calderon RN Registered Nurse    Romeo Carter, OTR/L Occupational Therapist                    Occupational Therapy Education       Title: PT OT SLP Therapies (In Progress)       Topic: Occupational Therapy (In Progress)       Point: ADL training (Done)       Description:   Instruct learner(s) on proper safety adaptation and remediation techniques during self care or transfers.   Instruct in proper use of assistive devices.                  Learning Progress Summary             Patient Acceptance, E, VU,NR by MM at 9/4/2024 1512    Acceptance, E, VU by LEON at 9/1/2024 1022                         Point: Home exercise program (Done)       Description:   Instruct learner(s) on appropriate technique for monitoring, assisting and/or progressing therapeutic exercises/activities.                  Learning Progress Summary             Patient Acceptance, E, VU by AC at 9/1/2024 1022                         Point: Precautions (Not Started)       Description:   Instruct learner(s) on prescribed precautions during self-care and functional transfers.                  Learner Progress:  Not documented in this visit.              Point: Body mechanics (Done)       Description:   Instruct learner(s) on proper positioning and spine alignment during self-care, functional mobility activities and/or exercises.                  Learning Progress Summary             Patient Acceptance, E, VU,NR by MM at 9/4/2024 1512    Acceptance, E, VU by LEON at 9/1/2024 1022                                         User Key       Initials Effective Dates Name Provider  Type Discipline    AC 02/03/23 -  Damir Marley, OTR/L, CNT Occupational Therapist OT     07/11/23 -  Romeo Cowan, OTR/L Occupational Therapist OT                  OT Recommendation and Plan     Plan of Care Review  Plan of Care Reviewed With: patient  Progress: no change  Outcome Evaluation: OT tx completed. Pt is alert and agreeable to therapy. Pt reports no pain. Pt reports being anxious about hearing about discharge as he is eager to leave. Pt was CGA-min A for sit to stand t/f. Pt was CGA for functional mobility chair to BR to chair with quad cane. Pt was supervision to CGA for all grooming tasks completed in supported standing at sink. Pt sat at toilet to don deoderant, but does not void. Pt was mod A to don hospital gown over back side. Continue OT POC.     Time Calculation:         Time Calculation- OT       Row Name 09/04/24 1123             Time Calculation- OT    OT Start Time 1123  -MM      OT Stop Time 1203  -MM      OT Time Calculation (min) 40 min  -MM      Total Timed Code Minutes- OT 40 minute(s)  -MM      OT Received On 09/04/24  -MM         Timed Charges    41480 - OT Self Care/Mgmt Minutes 40  -MM         Total Minutes    Timed Charges Total Minutes 40  -MM       Total Minutes 40  -MM                User Key  (r) = Recorded By, (t) = Taken By, (c) = Cosigned By      Initials Name Provider Type     Romeo Cowan, OTR/L Occupational Therapist                  Therapy Charges for Today       Code Description Service Date Service Provider Modifiers Qty    [id number] HC OT SELF CARE/MGMT/TRAIN EA 15 MIN 9/4/2024 Romeo Cowan OTR/L GO 3                 AMY Diaz/OPAL  9/4/2024

## 2024-09-04 NOTE — PROGRESS NOTES
[facility] Medicine Services  INPATIENT PROGRESS NOTE    Patient Name: Rubin Martell  Date of Admission: 8/31/2024  Today's Date: 09/04/24  Length of Stay: 4  Primary Care Physician: Toby Saavedra MD    Subjective   Chief Complaint: Syncope  HPI   89-year-old male with history of atrial fibrillation, and Hyperstat hyperplasia, hypertension, hyperlipidemia, hypothyroidism, pacemaker in situ, peripheral neuropathy unknown etiology, obstructive sleep apnea, admitted on 8/31/2024 after an episode of syncope.    Patient has been managed for vasovagal syncope.  Imaging studies showed patchy bilateral areas of groundglass opacities right greater than left, possibly infectious etiology.  Also managed for pneumonia, with Zosyn IV.    I started taking care of this patient 9/4/2024.  The patient today reports no additional symptoms.  No dizziness.  No episodes of syncope.  No chest pain, no palpitations, and is asking for update on his discharge status to skilled nursing facility versus home.        Review of Systems   All pertinent negatives and positives are as above. All other systems have been reviewed and are negative unless otherwise stated.     Objective    Temp:  [97.4 °F (36.3 °C)-98.2 °F (36.8 °C)] 98.2 °F (36.8 °C)  Heart Rate:  [63-69] 65  Resp:  [16-18] 18  BP: (128-153)/(51-65) 141/63  Physical Exam  Constitutional:       Appearance: Normal appearance.  Alert oriented x 3.  No respiratory distress  HENT:      Head: Normocephalic and atraumatic.      Nose: Nose normal.      Mouth/Throat:      Mouth: Mucous membranes are moist.      Pharynx: Oropharynx is clear.   Eyes:      Extraocular Movements: Extraocular movements intact.      Conjunctiva/sclera: Conjunctivae normal.      Pupils: Pupils are equal, round, and reactive to light.   Cardiovascular:      Rate and Rhythm: Normal rate and regular rhythm.      Pulses: Normal pulses.   Pulmonary:      Effort: No respiratory distress.       Breath sounds: Normal breath sounds. No wheezing, rhonchi or rales.   Abdominal:      General: Abdomen is flat. Bowel sounds are normal.      Palpations: Abdomen is soft.      Tenderness: There is no guarding or rebound.   Musculoskeletal:         General: Normal range of motion.      Cervical back: Normal range of motion and neck supple.   Extremities:  No lower extremity edema.  Skin:     Capillary Refill: Capillary refill takes less than 2 seconds.      Coloration: Skin is not jaundiced.      Findings: No rash.   Neurological:      General: No focal deficit present.      Mental Status: Patient is alert, oriented to place time and person.     Sensory: No sensory deficit.      Motor: No weakness.      Coordination: Coordination normal.   Psychiatric:         Mood and Affect: Mood normal.         Behavior: Behavior normal.           Results Review:  I have reviewed the labs, radiology results, and diagnostic studies.    Laboratory Data:   Results from last 7 days   Lab Units 09/03/24  0517 09/02/24 0445 09/01/24  0531   WBC 10*3/mm3 5.10 5.16 4.51   HEMOGLOBIN g/dL 9.4* 9.1* 9.5*   HEMATOCRIT % 30.5* 29.4* 30.3*   PLATELETS 10*3/mm3 222 207 219        Results from last 7 days   Lab Units 09/03/24  0517 09/02/24  0446 09/01/24  0531 08/31/24  1032   SODIUM mmol/L 138 140 141 140   POTASSIUM mmol/L 4.6 4.9 4.5 4.6   CHLORIDE mmol/L 103 102 104 103   CO2 mmol/L 28.0 29.0 30.0* 28.0   BUN mg/dL 26* 27* 26* 30*   CREATININE mg/dL 1.84* 1.96* 1.57* 1.76*   CALCIUM mg/dL 8.5* 8.4* 8.4* 8.6   BILIRUBIN mg/dL  --   --  0.3 0.5   ALK PHOS U/L  --   --  74 80   ALT (SGPT) U/L  --   --  20 23   AST (SGOT) U/L  --   --  31 41*   GLUCOSE mg/dL 96 102* 101* 119*       Culture Data:   Blood Culture   Date Value Ref Range Status   08/31/2024 No growth at 3 days  Preliminary   08/31/2024 No growth at 3 days  Preliminary       Radiology Data:   Imaging Results (Last 24 Hours)       Procedure Component Value Units Date/Time    US  Carotid Bilateral [[id number]] Collected: 09/03/24 1259     Updated: 09/03/24 1303    Narrative:      History: Carotid occlusive disease       Impression:      Impression:  1. There is 50 to 69% stenosis of the right internal carotid artery.  2. There is 50 to 69% stenosis of the left internal carotid artery.  3. Antegrade flow is demonstrated in bilateral vertebral arteries.     Comments: Bilateral carotid vertebral arterial duplex scan was  performed.     Grayscale imaging shows intimal thickening and calcified elements at the  carotid bifurcation. The right internal carotid artery peak systolic  velocity is 287 cm/sec. The end-diastolic velocity is 57.1 cm/sec. The  right ICA/CCA ratio is approximately 3.4. These findings correlate with  50 to 69% stenosis of the right internal carotid artery.     Grayscale imaging shows intimal thickening and calcified elements at the  carotid bifurcation. The left internal carotid artery peak systolic  velocity is 125.3 cm/sec. The end-diastolic velocity is 5 cm/sec. The  left ICA/CCA ratio is approximately 1.7. These findings correlate with  50 to 69% stenosis of the left internal carotid artery.     Antegrade flow is demonstrated in bilateral vertebral arteries.  There is greater than 50% stenosis in bilateral external carotid  arteries.     This report was signed and finalized on 9/3/2024 1:00 PM by Dr. Gume Ingram MD.               I have reviewed the patient's current medications.     Assessment/Plan   Assessment  Active Hospital Problems    Diagnosis     **Vasovagal syncope     Gait instability     Anemia of chronic disease     Slow transit constipation     [place] (hard of hearing)     Neuropathy     Pacemaker     Stage 3b chronic kidney disease     Sleep apnea      Syncope, likely vasovagal versus orthostatic  Questionable pneumonia, treated  Gait instability  Peripheral neuropathy  Chronic kidney disease stage IIIb  Pacemaker in situ  Hypertension  Obstructive sleep  apnea  Bilateral carotid artery disease 50-69%  Chronic normocytic anemia  Chronic diastolic cardiomyopathy, mild aortic stenosis  Constipation      On 9/3/2024 creatinine 1.84, improved from prior 1.96; stable compared to baseline.  Hemoglobin 9.4, stable.          Treatment Plan  Patient completed antibiotic treatment  Continue physical Occupational Therapy.      Continue amiodarone and apixaban.  Continue statin.  Continue levothyroxine same dose.      Pending skilled nursing facility placement.  Referral to [facility] send as per case management/ notes.    Medical Decision Making  Number and Complexity of problems: More than 10, moderate complexity  Differential Diagnosis: See above    Conditions and Status        Condition is improving.     [facility] Data  External documents reviewed: None  Cardiac tracing (EKG, telemetry) interpretation: Atrial paced  Radiology interpretation: Radiology reports was reviewed  Labs reviewed: Yes  Any tests that were considered but not ordered: No     Decision rules/scores evaluated (example [id number], [place], etc): None     Discussed with: Patient     Care Planning  Shared decision making: With patient  Code status and discussions: Full code    Disposition  Social Determinants of Health that impact treatment or disposition: None  I expect the patient to be discharged to skilled nursing facility once approved.    Electronically signed by Aston Mendes MD, 09/04/24, 12:48 CDT.

## 2024-09-04 NOTE — PLAN OF CARE
Goal Outcome Evaluation:  Plan of Care Reviewed With: patient  Progress: improving         Pt with no c/o pain thus far this shift. iV saline locked. Up ad nancy with cane. Voiding per BRP. Resting well between care. VSS. Safety maintained.

## 2024-09-05 LAB
BACTERIA SPEC AEROBE CULT: NORMAL
BACTERIA SPEC AEROBE CULT: NORMAL

## 2024-09-05 NOTE — THERAPY DISCHARGE NOTE
Acute Care - Physical Therapy Discharge Summary  [facility]       Patient Name: Rubin Martell  : 1935  MRN: [id number]    Today's Date: 2024  Onset of Illness/Injury or Date of Surgery: 24       Referring Physician: Dr. Millard      Admit Date: 2024      PT Recommendation and Plan    Visit Dx:    ICD-10-CM ICD-9-CM   1. Syncope, unspecified syncope type  R55 780.2   2. Pneumonia of both lower lobes due to infectious organism  J18.9 486   3. Pharyngeal dysphagia  R13.13 787.23   4. Decreased functional mobility and endurance [Z74.09]  Z74.09 780.99   5. Gait instability [R26.81]  R26.81 781.2        Outcome Measures       Row Name 24 1145             How much help from another person do you currently need...    Turning from your back to your side while in flat bed without using bedrails? 4  -NATALIE      Moving from lying on back to sitting on the side of a flat bed without bedrails? 4  -NATALIE      Moving to and from a bed to a chair (including a wheelchair)? 3  -NATALIE      Standing up from a chair using your arms (e.g., wheelchair, bedside chair)? 3  -NATALIE      Climbing 3-5 steps with a railing? 3  -NATALIE      To walk in hospital room? 3  -NATALIE      AM-PAC 6 Clicks Score (PT) 20  -NATALIE      Highest Level of Mobility Goal 6 --> Walk 10 steps or more  -NATALIE         Functional Assessment    Outcome Measure Options AM-PAC 6 Clicks Basic Mobility (PT)  -NATALIE                User Key  (r) = Recorded By, (t) = Taken By, (c) = Cosigned By      Initials Name Provider Type    Toney Ferguson PTA Physical Therapist Assistant                         PT Rehab Goals       Row Name 24 0700             Bed Mobility Goal 1 (PT)    Activity/Assistive Device (Bed Mobility Goal 1, PT) sit to supine;supine to sit;rolling to left;rolling to right  -AB      [place] Level/Cues Needed (Bed Mobility Goal 1, PT) independent  -AB      Time Frame (Bed Mobility Goal 1, PT) long term goal (LTG)  -AB      Progress/Outcomes (Bed  Mobility Goal 1, PT) goal not met  -AB         Transfer Goal 1 (PT)    Activity/Assistive Device (Transfer Goal 1, PT) sit-to-stand/stand-to-sit;bed-to-chair/chair-to-bed  -AB      [place] Level/Cues Needed (Transfer Goal 1, PT) independent  -AB      Time Frame (Transfer Goal 1, PT) long term goal (LTG)  -AB      Progress/Outcome (Transfer Goal 1, PT) goal not met  -AB         Gait Training Goal 1 (PT)    Activity/Assistive Device (Gait Training Goal 1, PT) gait (walking locomotion);increase endurance/gait distance;increase energy conservation;cane, quad  -AB      [place] Level (Gait Training Goal 1, PT) standby assist  -AB      Distance (Gait Training Goal 1, PT) 60  -AB      Time Frame (Gait Training Goal 1, PT) long term goal (LTG)  -AB      Progress/Outcome (Gait Training Goal 1, PT) goal not met  -AB                User Key  (r) = Recorded By, (t) = Taken By, (c) = Cosigned By      Initials Name Provider Type Discipline    Dayana Reyes, PTA Physical Therapist Assistant PT                        PT Discharge Summary  Anticipated Discharge Disposition (PT): skilled nursing facility  Reason for Discharge: Discharge from facility  Outcomes Achieved: Refer to plan of care for updates on goals achieved  Discharge Destination: Home with home health      Dayana Erazo PTA   9/5/2024

## 2024-09-05 NOTE — OUTREACH NOTE
Prep Survey      Flowsheet Row Responses   [place] facility patient discharged from? [facility]   Is LACE score < 7 ? No   Eligibility Readm Mgmt   Discharge diagnosis Vasovagal syncope   Does the patient have one of the following disease processes/diagnoses(primary or secondary)? Other   Does the patient have Home health ordered? No   Is there a DME ordered? No   Prep survey completed? Yes            SARAI CHAVEZ - Registered Nurse

## 2024-09-05 NOTE — THERAPY DISCHARGE NOTE
Acute Care - Occupational Therapy Discharge Summary  [facility]     Patient Name: Rubin Martell  : 1935  MRN: [id number]    Today's Date: 2024  Onset of Illness/Injury or Date of Surgery: 24       Referring Physician: Dr. Millard      Admit Date: 2024        OT Recommendation and Plan    Visit Dx:    ICD-10-CM ICD-9-CM   1. Syncope, unspecified syncope type  R55 780.2   2. Pneumonia of both lower lobes due to infectious organism  J18.9 486   3. Pharyngeal dysphagia  R13.13 787.23   4. Decreased functional mobility and endurance [Z74.09]  Z74.09 780.99   5. Gait instability [R26.81]  R26.81 781.2                OT Rehab Goals       Row Name 24 1500             Transfer Goal 1 (OT)    Activity/Assistive Device (Transfer Goal 1, OT) bed-to-chair/chair-to-bed;toilet;shower chair  -JJ      [place] Level/Cues Needed (Transfer Goal 1, OT) standby assist  -JJ      Time Frame (Transfer Goal 1, OT) long term goal (LTG);10 days  -JJ      Progress/Outcome (Transfer Goal 1, OT) goal not met;discharged from facility  -         Bathing Goal 1 (OT)    Activity/Device (Bathing Goal 1, OT) shower chair;bathing skills, all  -JJ      [place] Level/Cues Needed (Bathing Goal 1, OT) standby assist  -JJ      Time Frame (Bathing Goal 1, OT) long term goal (LTG);10 days  -JJ      Progress/Outcomes (Bathing Goal 1, OT) goal not met;discharged from facility  -         Dressing Goal 1 (OT)    Activity/Device (Dressing Goal 1, OT) lower body dressing  -JJ      [place]/Cues Needed (Dressing Goal 1, OT) standby assist  -JJ      Time Frame (Dressing Goal 1, OT) long term goal (LTG);10 days  -JJ      Progress/Outcome (Dressing Goal 1, OT) goal not met;discharged from facility  -                User Key  (r) = Recorded By, (t) = Taken By, (c) = Cosigned By      Initials Name Provider Type Discipline    Kristie Bah, OTR/L, CSRS Occupational Therapist OT                        Timed Therapy Charges   Total Units: 3      Suggested Charges  Total Units: 3      Procedure Name Documented Minutes Units Code    HC OT SELF CARE/MGMT/TRAIN EA 15 MIN 40 3   87505 (CPT®)                 Documented Minutes  Total Minutes: 40      Therapy Provided Minutes    05582 - OT Self Care/Mgmt Minutes 40                        OT Discharge Summary  Anticipated Discharge Disposition (OT): skilled nursing facility, sub acute care setting  Reason for Discharge: Discharge from facility  Outcomes Achieved: Refer to plan of care for updates on goals achieved  Discharge Destination: Home with assist      AMY Romero/L, CSRS  9/5/2024

## 2024-09-10 ENCOUNTER — READMISSION MANAGEMENT (OUTPATIENT)
Dept: CALL CENTER | Facility: HOSPITAL | Age: 89
End: 2024-09-10
Payer: MEDICARE

## 2024-09-10 NOTE — OUTREACH NOTE
Medical Week 1 Survey      Flowsheet Row Responses   [facility], [place] by [facility] patient discharged from? [place]   Does the patient have one of the following disease processes/diagnoses(primary or secondary)? Other   Week 1 attempt successful? Yes   Call start time 1040   Call end time 1041   Discharge diagnosis Vasovagal syncope   Meds reviewed with patient/caregiver? Yes   Is the patient having any side effects they believe may be caused by any medication additions or changes? No   Does the patient have all medications ordered at discharge? N/A   Is the patient taking all medications as directed (includes completed medication regime)? Yes   Does the patient have a primary care provider?  Yes   Does the patient have an appointment with their PCP within 7 days of discharge? Yes   Comments regarding PCP PCP follow up 9/10/24   Has the patient kept scheduled appointments due by today? N/A   Has home health visited the patient within 72 hours of discharge? N/A   Psychosocial issues? No   Did the patient receive a copy of their discharge instructions? Yes   Nursing interventions Reviewed instructions with patient   What is the patient's perception of their health status since discharge? Improving   Is the patient/caregiver able to teach back signs and symptoms related to disease process for when to call PCP? Yes   Is the patient/caregiver able to teach back signs and symptoms related to disease process for when to call 911? Yes   Is the patient/caregiver able to teach back the hierarchy of who to call/visit for symptoms/problems? PCP, Specialist, Home health nurse, Urgent Care, ED, 911 Yes   If the patient is a current smoker, are they able to teach back resources for cessation? Not a smoker   Week 1 call completed? Yes   Would this patient benefit from a Referral to Amb Social Work? No   Is the patient interested in additional calls from an ambulatory ? No   Call end time 1041            Yesica SANCHEZ - Registered Nurse